# Patient Record
Sex: MALE | Race: WHITE | NOT HISPANIC OR LATINO | Employment: UNEMPLOYED | URBAN - METROPOLITAN AREA
[De-identification: names, ages, dates, MRNs, and addresses within clinical notes are randomized per-mention and may not be internally consistent; named-entity substitution may affect disease eponyms.]

---

## 2018-05-23 ENCOUNTER — OFFICE VISIT CONVERTED (OUTPATIENT)
Dept: FAMILY MEDICINE CLINIC | Facility: CLINIC | Age: 43
End: 2018-05-23
Attending: NURSE PRACTITIONER

## 2018-06-20 ENCOUNTER — OFFICE VISIT CONVERTED (OUTPATIENT)
Dept: FAMILY MEDICINE CLINIC | Facility: CLINIC | Age: 43
End: 2018-06-20
Attending: NURSE PRACTITIONER

## 2018-07-18 ENCOUNTER — OFFICE VISIT CONVERTED (OUTPATIENT)
Dept: FAMILY MEDICINE CLINIC | Facility: CLINIC | Age: 43
End: 2018-07-18
Attending: NURSE PRACTITIONER

## 2019-01-18 ENCOUNTER — HOSPITAL ENCOUNTER (OUTPATIENT)
Dept: FAMILY MEDICINE CLINIC | Facility: CLINIC | Age: 44
Discharge: HOME | End: 2019-01-18
Attending: NURSE PRACTITIONER

## 2019-01-18 ENCOUNTER — OFFICE VISIT CONVERTED (OUTPATIENT)
Dept: FAMILY MEDICINE CLINIC | Facility: CLINIC | Age: 44
End: 2019-01-18
Attending: NURSE PRACTITIONER

## 2019-01-18 LAB
ALBUMIN SERPL-MCNC: 4.4 G/DL (ref 3.5–5)
ALBUMIN/GLOB SERPL: 1.8 {RATIO} (ref 1.4–2.6)
ALP SERPL-CCNC: 70 U/L (ref 53–128)
ALT SERPL-CCNC: 20 U/L (ref 10–40)
ANION GAP SERPL CALC-SCNC: 18 MMOL/L (ref 8–19)
AST SERPL-CCNC: 17 U/L (ref 15–50)
BASOPHILS # BLD AUTO: 0.06 10*3/UL (ref 0–0.2)
BASOPHILS NFR BLD AUTO: 0.73 % (ref 0–3)
BILIRUB SERPL-MCNC: 0.25 MG/DL (ref 0.2–1.3)
BUN SERPL-MCNC: 13 MG/DL (ref 5–25)
BUN/CREAT SERPL: 13 {RATIO} (ref 6–20)
CALCIUM SERPL-MCNC: 9.2 MG/DL (ref 8.7–10.4)
CHLORIDE SERPL-SCNC: 108 MMOL/L (ref 99–111)
CHOLEST SERPL-MCNC: 182 MG/DL (ref 107–200)
CHOLEST/HDLC SERPL: 5.1 {RATIO} (ref 3–6)
CONV CO2: 23 MMOL/L (ref 22–32)
CONV TOTAL PROTEIN: 6.8 G/DL (ref 6.3–8.2)
CREAT UR-MCNC: 1.04 MG/DL (ref 0.7–1.2)
EOSINOPHIL # BLD AUTO: 0.18 10*3/UL (ref 0–0.7)
EOSINOPHIL # BLD AUTO: 2.16 % (ref 0–7)
ERYTHROCYTE [DISTWIDTH] IN BLOOD BY AUTOMATED COUNT: 11.6 % (ref 11.5–14.5)
GFR SERPLBLD BASED ON 1.73 SQ M-ARVRAT: >60 ML/MIN/{1.73_M2}
GLOBULIN UR ELPH-MCNC: 2.4 G/DL (ref 2–3.5)
GLUCOSE SERPL-MCNC: 85 MG/DL (ref 70–99)
HBA1C MFR BLD: 14.8 G/DL (ref 14–18)
HCT VFR BLD AUTO: 41.6 % (ref 42–52)
HDLC SERPL-MCNC: 36 MG/DL (ref 40–60)
LDLC SERPL CALC-MCNC: 98 MG/DL (ref 70–100)
LYMPHOCYTES # BLD AUTO: 3.27 10*3/UL (ref 1–5)
MCH RBC QN AUTO: 32 PG (ref 27–31)
MCHC RBC AUTO-ENTMCNC: 35.5 G/DL (ref 33–37)
MCV RBC AUTO: 90.1 FL (ref 80–96)
MONOCYTES # BLD AUTO: 0.59 10*3/UL (ref 0.2–1.2)
MONOCYTES NFR BLD AUTO: 7.1 % (ref 3–10)
NEUTROPHILS # BLD AUTO: 4.27 10*3/UL (ref 2–8)
NEUTROPHILS NFR BLD AUTO: 51 % (ref 30–85)
NRBC BLD AUTO-RTO: 0 % (ref 0–0.01)
OSMOLALITY SERPL CALC.SUM OF ELEC: 299 MOSM/KG (ref 273–304)
PLATELET # BLD AUTO: 292 10*3/UL (ref 130–400)
PMV BLD AUTO: 7 FL (ref 7.4–10.4)
POTASSIUM SERPL-SCNC: 3.9 MMOL/L (ref 3.5–5.3)
RBC # BLD AUTO: 4.62 10*6/UL (ref 4.7–6.1)
SODIUM SERPL-SCNC: 145 MMOL/L (ref 135–147)
T4 FREE SERPL-MCNC: 1.4 NG/DL (ref 0.9–1.8)
TRIGL SERPL-MCNC: 238 MG/DL (ref 40–150)
TSH SERPL-ACNC: 1.35 M[IU]/L (ref 0.27–4.2)
VARIANT LYMPHS NFR BLD MANUAL: 39 % (ref 20–45)
VLDLC SERPL-MCNC: 48 MG/DL (ref 5–37)
WBC # BLD AUTO: 8.37 10*3/UL (ref 4.8–10.8)

## 2019-01-19 LAB — 25(OH)D3 SERPL-MCNC: 35.7 NG/ML (ref 30–100)

## 2019-07-11 ENCOUNTER — HOSPITAL ENCOUNTER (OUTPATIENT)
Dept: FAMILY MEDICINE CLINIC | Facility: CLINIC | Age: 44
Discharge: HOME | End: 2019-07-11
Attending: NURSE PRACTITIONER

## 2019-07-11 ENCOUNTER — CONVERSION ENCOUNTER (OUTPATIENT)
Dept: FAMILY MEDICINE CLINIC | Facility: CLINIC | Age: 44
End: 2019-07-11

## 2019-07-11 ENCOUNTER — OFFICE VISIT CONVERTED (OUTPATIENT)
Dept: FAMILY MEDICINE CLINIC | Facility: CLINIC | Age: 44
End: 2019-07-11
Attending: NURSE PRACTITIONER

## 2019-07-11 LAB
ALBUMIN SERPL-MCNC: 4.3 G/DL (ref 3.5–5)
ALBUMIN/GLOB SERPL: 1.6 {RATIO} (ref 1.4–2.6)
ALP SERPL-CCNC: 60 U/L (ref 53–128)
ALT SERPL-CCNC: 40 U/L (ref 10–40)
ANION GAP SERPL CALC-SCNC: 17 MMOL/L (ref 8–19)
AST SERPL-CCNC: 21 U/L (ref 15–50)
BASOPHILS # BLD AUTO: 0.08 10*3/UL (ref 0–0.2)
BASOPHILS NFR BLD AUTO: 1 % (ref 0–3)
BILIRUB SERPL-MCNC: 0.28 MG/DL (ref 0.2–1.3)
BUN SERPL-MCNC: 12 MG/DL (ref 5–25)
BUN/CREAT SERPL: 12 {RATIO} (ref 6–20)
CALCIUM SERPL-MCNC: 9.2 MG/DL (ref 8.7–10.4)
CHLORIDE SERPL-SCNC: 100 MMOL/L (ref 99–111)
CHOLEST SERPL-MCNC: 190 MG/DL (ref 107–200)
CHOLEST/HDLC SERPL: 5.9 {RATIO} (ref 3–6)
CONV ABS IMM GRAN: 0.03 10*3/UL (ref 0–0.2)
CONV CO2: 24 MMOL/L (ref 22–32)
CONV IMMATURE GRAN: 0.4 % (ref 0–1.8)
CONV TOTAL PROTEIN: 7 G/DL (ref 6.3–8.2)
CREAT UR-MCNC: 1.01 MG/DL (ref 0.7–1.2)
DEPRECATED RDW RBC AUTO: 40.4 FL (ref 35.1–43.9)
EOSINOPHIL # BLD AUTO: 0.19 10*3/UL (ref 0–0.7)
EOSINOPHIL # BLD AUTO: 2.3 % (ref 0–7)
ERYTHROCYTE [DISTWIDTH] IN BLOOD BY AUTOMATED COUNT: 12.3 % (ref 11.6–14.4)
GFR SERPLBLD BASED ON 1.73 SQ M-ARVRAT: >60 ML/MIN/{1.73_M2}
GLOBULIN UR ELPH-MCNC: 2.7 G/DL (ref 2–3.5)
GLUCOSE SERPL-MCNC: 101 MG/DL (ref 70–99)
HBA1C MFR BLD: 14.2 G/DL (ref 14–18)
HCT VFR BLD AUTO: 42.3 % (ref 42–52)
HDLC SERPL-MCNC: 32 MG/DL (ref 40–60)
LDLC SERPL CALC-MCNC: 88 MG/DL (ref 70–100)
LYMPHOCYTES # BLD AUTO: 2.83 10*3/UL (ref 1–5)
MCH RBC QN AUTO: 30.1 PG (ref 27–31)
MCHC RBC AUTO-ENTMCNC: 33.6 G/DL (ref 33–37)
MCV RBC AUTO: 89.6 FL (ref 80–96)
MONOCYTES # BLD AUTO: 0.82 10*3/UL (ref 0.2–1.2)
MONOCYTES NFR BLD AUTO: 9.8 % (ref 3–10)
NEUTROPHILS # BLD AUTO: 4.39 10*3/UL (ref 2–8)
NEUTROPHILS NFR BLD AUTO: 52.6 % (ref 30–85)
NRBC CBCN: 0 % (ref 0–0.7)
OSMOLALITY SERPL CALC.SUM OF ELEC: 284 MOSM/KG (ref 273–304)
PLATELET # BLD AUTO: 308 10*3/UL (ref 130–400)
PMV BLD AUTO: 9.6 FL (ref 9.4–12.4)
POTASSIUM SERPL-SCNC: 3.9 MMOL/L (ref 3.5–5.3)
RBC # BLD AUTO: 4.72 10*6/UL (ref 4.7–6.1)
SODIUM SERPL-SCNC: 137 MMOL/L (ref 135–147)
T4 FREE SERPL-MCNC: 1.2 NG/DL (ref 0.9–1.8)
TRIGL SERPL-MCNC: 349 MG/DL (ref 40–150)
TSH SERPL-ACNC: 2.71 M[IU]/L (ref 0.27–4.2)
VARIANT LYMPHS NFR BLD MANUAL: 33.9 % (ref 20–45)
VLDLC SERPL-MCNC: 70 MG/DL (ref 5–37)
WBC # BLD AUTO: 8.34 10*3/UL (ref 4.8–10.8)

## 2020-01-10 ENCOUNTER — OFFICE VISIT CONVERTED (OUTPATIENT)
Dept: FAMILY MEDICINE CLINIC | Facility: CLINIC | Age: 45
End: 2020-01-10
Attending: NURSE PRACTITIONER

## 2020-01-10 ENCOUNTER — HOSPITAL ENCOUNTER (OUTPATIENT)
Dept: FAMILY MEDICINE CLINIC | Facility: CLINIC | Age: 45
Discharge: HOME | End: 2020-01-10
Attending: NURSE PRACTITIONER

## 2020-01-10 LAB
25(OH)D3 SERPL-MCNC: 32.1 NG/ML (ref 30–100)
ALBUMIN SERPL-MCNC: 4.4 G/DL (ref 3.5–5)
ALBUMIN/GLOB SERPL: 1.6 {RATIO} (ref 1.4–2.6)
ALP SERPL-CCNC: 54 U/L (ref 53–128)
ALT SERPL-CCNC: 42 U/L (ref 10–40)
ANION GAP SERPL CALC-SCNC: 14 MMOL/L (ref 8–19)
AST SERPL-CCNC: 25 U/L (ref 15–50)
BASOPHILS # BLD AUTO: 0.05 10*3/UL (ref 0–0.2)
BASOPHILS NFR BLD AUTO: 0.6 % (ref 0–3)
BILIRUB SERPL-MCNC: 0.51 MG/DL (ref 0.2–1.3)
BUN SERPL-MCNC: 7 MG/DL (ref 5–25)
BUN/CREAT SERPL: 7 {RATIO} (ref 6–20)
CALCIUM SERPL-MCNC: 9.8 MG/DL (ref 8.7–10.4)
CHLORIDE SERPL-SCNC: 100 MMOL/L (ref 99–111)
CHOLEST SERPL-MCNC: 178 MG/DL (ref 107–200)
CHOLEST/HDLC SERPL: 5.9 {RATIO} (ref 3–6)
CONV ABS IMM GRAN: 0.02 10*3/UL (ref 0–0.2)
CONV CO2: 26 MMOL/L (ref 22–32)
CONV IMMATURE GRAN: 0.3 % (ref 0–1.8)
CONV TOTAL PROTEIN: 7.1 G/DL (ref 6.3–8.2)
CREAT UR-MCNC: 1.01 MG/DL (ref 0.7–1.2)
DEPRECATED RDW RBC AUTO: 40.8 FL (ref 35.1–43.9)
EOSINOPHIL # BLD AUTO: 0.22 10*3/UL (ref 0–0.7)
EOSINOPHIL # BLD AUTO: 2.8 % (ref 0–7)
ERYTHROCYTE [DISTWIDTH] IN BLOOD BY AUTOMATED COUNT: 12.4 % (ref 11.6–14.4)
FOLATE SERPL-MCNC: >20 NG/ML (ref 4.8–20)
GFR SERPLBLD BASED ON 1.73 SQ M-ARVRAT: >60 ML/MIN/{1.73_M2}
GLOBULIN UR ELPH-MCNC: 2.7 G/DL (ref 2–3.5)
GLUCOSE SERPL-MCNC: 92 MG/DL (ref 70–99)
HCT VFR BLD AUTO: 39.7 % (ref 42–52)
HDLC SERPL-MCNC: 30 MG/DL (ref 40–60)
HGB BLD-MCNC: 13.4 G/DL (ref 14–18)
LDLC SERPL CALC-MCNC: 120 MG/DL (ref 70–100)
LYMPHOCYTES # BLD AUTO: 2.98 10*3/UL (ref 1–5)
LYMPHOCYTES NFR BLD AUTO: 37.4 % (ref 20–45)
MCH RBC QN AUTO: 30.5 PG (ref 27–31)
MCHC RBC AUTO-ENTMCNC: 33.8 G/DL (ref 33–37)
MCV RBC AUTO: 90.2 FL (ref 80–96)
MONOCYTES # BLD AUTO: 0.66 10*3/UL (ref 0.2–1.2)
MONOCYTES NFR BLD AUTO: 8.3 % (ref 3–10)
NEUTROPHILS # BLD AUTO: 4.04 10*3/UL (ref 2–8)
NEUTROPHILS NFR BLD AUTO: 50.6 % (ref 30–85)
NRBC CBCN: 0 % (ref 0–0.7)
OSMOLALITY SERPL CALC.SUM OF ELEC: 280 MOSM/KG (ref 273–304)
PLATELET # BLD AUTO: 299 10*3/UL (ref 130–400)
PMV BLD AUTO: 9.8 FL (ref 9.4–12.4)
POTASSIUM SERPL-SCNC: 3.8 MMOL/L (ref 3.5–5.3)
RBC # BLD AUTO: 4.4 10*6/UL (ref 4.7–6.1)
SODIUM SERPL-SCNC: 136 MMOL/L (ref 135–147)
T4 FREE SERPL-MCNC: 1.2 NG/DL (ref 0.9–1.8)
TRIGL SERPL-MCNC: 139 MG/DL (ref 40–150)
TSH SERPL-ACNC: 1.22 M[IU]/L (ref 0.27–4.2)
VIT B12 SERPL-MCNC: 940 PG/ML (ref 211–911)
VLDLC SERPL-MCNC: 28 MG/DL (ref 5–37)
WBC # BLD AUTO: 7.97 10*3/UL (ref 4.8–10.8)

## 2020-01-16 ENCOUNTER — HOSPITAL ENCOUNTER (OUTPATIENT)
Dept: FAMILY MEDICINE CLINIC | Facility: CLINIC | Age: 45
Discharge: HOME | End: 2020-01-16
Attending: NURSE PRACTITIONER

## 2020-01-16 LAB
IRON SATN MFR SERPL: 24 % (ref 20–55)
IRON SERPL-MCNC: 76 UG/DL (ref 70–180)
TIBC SERPL-MCNC: 316 UG/DL (ref 245–450)
TRANSFERRIN SERPL-MCNC: 221 MG/DL (ref 215–365)

## 2020-10-29 ENCOUNTER — CONVERSION ENCOUNTER (OUTPATIENT)
Dept: FAMILY MEDICINE CLINIC | Facility: CLINIC | Age: 45
End: 2020-10-29

## 2020-10-29 ENCOUNTER — OFFICE VISIT CONVERTED (OUTPATIENT)
Dept: FAMILY MEDICINE CLINIC | Facility: CLINIC | Age: 45
End: 2020-10-29
Attending: NURSE PRACTITIONER

## 2020-10-29 ENCOUNTER — HOSPITAL ENCOUNTER (OUTPATIENT)
Dept: FAMILY MEDICINE CLINIC | Facility: CLINIC | Age: 45
Discharge: HOME | End: 2020-10-29
Attending: NURSE PRACTITIONER

## 2020-10-29 LAB
ALBUMIN SERPL-MCNC: 4.3 G/DL (ref 3.5–5)
ALBUMIN/GLOB SERPL: 1.6 {RATIO} (ref 1.4–2.6)
ALP SERPL-CCNC: 67 U/L (ref 53–128)
ALT SERPL-CCNC: 36 U/L (ref 10–40)
ANION GAP SERPL CALC-SCNC: 13 MMOL/L (ref 8–19)
APPEARANCE UR: CLEAR
AST SERPL-CCNC: 21 U/L (ref 15–50)
BASOPHILS # BLD AUTO: 0.09 10*3/UL (ref 0–0.2)
BASOPHILS NFR BLD AUTO: 1.2 % (ref 0–3)
BILIRUB SERPL-MCNC: 0.33 MG/DL (ref 0.2–1.3)
BILIRUB UR QL: NEGATIVE
BUN SERPL-MCNC: 11 MG/DL (ref 5–25)
BUN/CREAT SERPL: 9 {RATIO} (ref 6–20)
CALCIUM SERPL-MCNC: 9.6 MG/DL (ref 8.7–10.4)
CHLORIDE SERPL-SCNC: 104 MMOL/L (ref 99–111)
CHOLEST SERPL-MCNC: 163 MG/DL (ref 107–200)
CHOLEST/HDLC SERPL: 5.6 {RATIO} (ref 3–6)
COLOR UR: YELLOW
CONV ABS IMM GRAN: 0.02 10*3/UL (ref 0–0.2)
CONV CO2: 27 MMOL/L (ref 22–32)
CONV COLLECTION SOURCE (UA): NORMAL
CONV IMMATURE GRAN: 0.3 % (ref 0–1.8)
CONV TOTAL PROTEIN: 7 G/DL (ref 6.3–8.2)
CONV UROBILINOGEN IN URINE BY AUTOMATED TEST STRIP: 0.2 {EHRLICHU}/DL (ref 0.1–1)
CREAT UR-MCNC: 1.16 MG/DL (ref 0.7–1.2)
DEPRECATED RDW RBC AUTO: 40.3 FL (ref 35.1–43.9)
EOSINOPHIL # BLD AUTO: 0.15 10*3/UL (ref 0–0.7)
EOSINOPHIL # BLD AUTO: 2 % (ref 0–7)
ERYTHROCYTE [DISTWIDTH] IN BLOOD BY AUTOMATED COUNT: 12.4 % (ref 11.6–14.4)
FOLATE SERPL-MCNC: >20 NG/ML (ref 4.8–20)
GFR SERPLBLD BASED ON 1.73 SQ M-ARVRAT: >60 ML/MIN/{1.73_M2}
GLOBULIN UR ELPH-MCNC: 2.7 G/DL (ref 2–3.5)
GLUCOSE SERPL-MCNC: 95 MG/DL (ref 70–99)
GLUCOSE UR QL: NEGATIVE MG/DL
HCT VFR BLD AUTO: 41.8 % (ref 42–52)
HDLC SERPL-MCNC: 29 MG/DL (ref 40–60)
HGB BLD-MCNC: 13.9 G/DL (ref 14–18)
HGB UR QL STRIP: NEGATIVE
IRON SATN MFR SERPL: 28 % (ref 20–55)
IRON SERPL-MCNC: 92 UG/DL (ref 70–180)
KETONES UR QL STRIP: NEGATIVE MG/DL
LDLC SERPL CALC-MCNC: 65 MG/DL (ref 70–100)
LEUKOCYTE ESTERASE UR QL STRIP: NEGATIVE
LYMPHOCYTES # BLD AUTO: 2.53 10*3/UL (ref 1–5)
LYMPHOCYTES NFR BLD AUTO: 33.7 % (ref 20–45)
MCH RBC QN AUTO: 29.8 PG (ref 27–31)
MCHC RBC AUTO-ENTMCNC: 33.3 G/DL (ref 33–37)
MCV RBC AUTO: 89.7 FL (ref 80–96)
MONOCYTES # BLD AUTO: 0.69 10*3/UL (ref 0.2–1.2)
MONOCYTES NFR BLD AUTO: 9.2 % (ref 3–10)
NEUTROPHILS # BLD AUTO: 4.03 10*3/UL (ref 2–8)
NEUTROPHILS NFR BLD AUTO: 53.6 % (ref 30–85)
NITRITE UR QL STRIP: NEGATIVE
NRBC CBCN: 0 % (ref 0–0.7)
OSMOLALITY SERPL CALC.SUM OF ELEC: 289 MOSM/KG (ref 273–304)
PH UR STRIP.AUTO: 6 [PH] (ref 5–8)
PLATELET # BLD AUTO: 309 10*3/UL (ref 130–400)
PMV BLD AUTO: 9.7 FL (ref 9.4–12.4)
POTASSIUM SERPL-SCNC: 4.2 MMOL/L (ref 3.5–5.3)
PROT UR QL: NEGATIVE MG/DL
RBC # BLD AUTO: 4.66 10*6/UL (ref 4.7–6.1)
SODIUM SERPL-SCNC: 140 MMOL/L (ref 135–147)
SP GR UR: 1 (ref 1–1.03)
TESTOST SERPL-MCNC: 187 NG/DL (ref 249–836)
TIBC SERPL-MCNC: 325 UG/DL (ref 245–450)
TRANSFERRIN SERPL-MCNC: 227 MG/DL (ref 215–365)
TRIGL SERPL-MCNC: 346 MG/DL (ref 40–150)
TSH SERPL-ACNC: 0.83 M[IU]/L (ref 0.27–4.2)
VIT B12 SERPL-MCNC: 765 PG/ML (ref 211–911)
VLDLC SERPL-MCNC: 69 MG/DL (ref 5–37)
WBC # BLD AUTO: 7.51 10*3/UL (ref 4.8–10.8)

## 2020-10-30 ENCOUNTER — HOSPITAL ENCOUNTER (OUTPATIENT)
Dept: FAMILY MEDICINE CLINIC | Facility: CLINIC | Age: 45
Discharge: HOME | End: 2020-10-30
Attending: NURSE PRACTITIONER

## 2020-10-31 LAB
TESTOST SERPL-MCNC: 243 NG/DL (ref 249–836)
TESTOSTERONE, FREE: 5.1 PG/ML (ref 6.8–21.5)

## 2020-11-01 LAB — TESTOSTERONE, FREE: 5.5 PG/ML (ref 6.8–21.5)

## 2021-01-10 ENCOUNTER — HOSPITAL ENCOUNTER (OUTPATIENT)
Dept: URGENT CARE | Facility: CLINIC | Age: 46
Discharge: HOME | End: 2021-01-10
Attending: NURSE PRACTITIONER

## 2021-02-04 ENCOUNTER — CONVERSION ENCOUNTER (OUTPATIENT)
Dept: FAMILY MEDICINE CLINIC | Facility: CLINIC | Age: 46
End: 2021-02-04

## 2021-03-10 ENCOUNTER — OFFICE VISIT (OUTPATIENT)
Dept: ENDOCRINOLOGY | Age: 46
End: 2021-03-10

## 2021-03-10 VITALS
BODY MASS INDEX: 32.14 KG/M2 | HEIGHT: 69 IN | RESPIRATION RATE: 16 BRPM | WEIGHT: 217 LBS | SYSTOLIC BLOOD PRESSURE: 134 MMHG | DIASTOLIC BLOOD PRESSURE: 86 MMHG

## 2021-03-10 DIAGNOSIS — E29.1 HYPOGONADISM IN MALE: Primary | ICD-10-CM

## 2021-03-10 PROCEDURE — 99204 OFFICE O/P NEW MOD 45 MIN: CPT | Performed by: INTERNAL MEDICINE

## 2021-03-10 RX ORDER — ATENOLOL 25 MG/1
TABLET ORAL
COMMUNITY
Start: 2021-01-09 | End: 2021-08-04 | Stop reason: SDUPTHER

## 2021-03-10 RX ORDER — MELOXICAM 15 MG/1
15 TABLET ORAL DAILY PRN
COMMUNITY
Start: 2021-02-27

## 2021-03-10 RX ORDER — HYDROCODONE BITARTRATE AND ACETAMINOPHEN 5; 325 MG/1; MG/1
1 TABLET ORAL EVERY 12 HOURS PRN
COMMUNITY
Start: 2021-03-02

## 2021-03-10 NOTE — ASSESSMENT & PLAN NOTE
Unfortunately we do not have the notes or the biochemical work-up that was done for this patient  We will try to obtain labs  Patient did present labs from his phone that showed a testosterone of 241  However there was no documentation of other pituitary hormones specifically the gonadotropins on this lab  Also unfortunate is that the patient is already started testosterone 200 mg IM weekly compounded with anastrozole and gonadorhelin  To properly assess the patient's need for testosterone and possible etiology of hypogonadism we may need to hold his current therapy for 4 to 6 weeks  After this time a LH/FSH, prolactin, total testosterone, free testosterone, sex hormone binding globulin, TSH, free T4, IGF-I, growth hormone, ACTH, and cortisol will need to be assessed at this patient's morning time 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

## 2021-03-10 NOTE — PROGRESS NOTES
"Chief Complaint  Hypogonadism    Subjective          Avtar Chapman presents to [facility] ENDOCRINOLOGY  45-year-old male with past medical history of of back pain and anxiety who presents for evaluation for hypogonadism.        Hypogonadism  Mr. Barney Chapman presents with labs today that show a testosterone level of 241.  Patient notes that his symptoms have been present for 1 to 2 years.  He notes fatigue and tiredness and difficulty with normal exercise routine.  He notes that he has intermittent morning erections.  He notes that he is able to have erections when stimulated.  He denies any history of head trauma or testicular trauma.  He notes he has to shave about twice weekly and that there has not been no change in his body hair.  He notes that he works and shifts and sometimes has to work night shift.  He notes that he is currently working nights.  He notes that he has been on night shift for the last 4 months.  His shifts are currently 10 PM to 6 AM.  He notes that he typically wakes around noon.  Patient notes that he is required no core to help with his back pain for the last several years.  He currently is taking testosterone 200 mg IM weekly combined and gonadorelin twice weekly. Patient notes that he feels about the same, though he may have some improvement in his exercise tolerance        Objective   Vital Signs:   /86   Resp 16   Ht 175.3 cm (69\")   Wt 98.4 kg (217 lb)   BMI 32.05 kg/m²     Physical Exam  Vitals reviewed.   Constitutional:       Appearance: Normal appearance. He is normal weight.   Eyes:      Extraocular Movements: Extraocular movements intact.      Conjunctiva/sclera: Conjunctivae normal.      Pupils: Pupils are equal, round, and reactive to light.   Neck:      Thyroid: No thyroid mass, thyromegaly or thyroid tenderness.      Vascular: No carotid bruit.   Cardiovascular:      Rate and Rhythm: Normal rate and regular rhythm.   Abdominal:      General: " Abdomen is flat. Bowel sounds are normal.      Palpations: Abdomen is soft.   Lymphadenopathy:      Cervical: No cervical adenopathy.   Skin:     General: Skin is warm and dry.   Neurological:      General: No focal deficit present.      Mental Status: He is alert and oriented to person, place, and time.   Psychiatric:         Mood and Affect: Mood normal.         Behavior: Behavior normal.        Result Review :   The following data was reviewed by: Jaylene Petersen MD on 03/10/2021:      [facility] results show a testosterone level of 241                                  Data reviewed: Patient presents a copy of his labs from [facility]          Assessment and Plan    Diagnoses and all orders for this visit:    1. Hypogonadism in male (Primary)  Assessment & Plan:  Unfortunately we do not have the notes or the biochemical work-up that was done for this patient  We will try to obtain labs  Patient did present labs from his phone that showed a testosterone of 241  However there was no documentation of other pituitary hormones specifically the gonadotropins on this lab  Also unfortunate is that the patient is already started testosterone 200 mg IM weekly compounded with anastrozole and gonadorhelin  To properly assess the patient's need for testosterone and possible etiology of hypogonadism we may need to hold his current therapy for 4 to 6 weeks  After this time a LH/FSH, prolactin, total testosterone, free testosterone, sex hormone binding globulin, TSH, free T4, IGF-I, growth hormone, ACTH, and cortisol will need to be assessed at this patient's morning time 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          I spent 45 minutes caring for Avtar on this date of service. This time includes time spent by me in the following activities:preparing for the visit, reviewing tests, obtaining and/or reviewing a separately obtained history, performing a medically appropriate examination and/or  evaluation , counseling and educating the patient/family/caregiver, referring and communicating with other health care professionals  and documenting information in the medical record  Follow Up   Return in about 8 weeks (around 5/5/2021).  Patient was given instructions and counseling regarding his condition or for health maintenance advice. Please see specific information pulled into the AVS if appropriate.

## 2021-03-13 ENCOUNTER — HOSPITAL ENCOUNTER (OUTPATIENT)
Dept: URGENT CARE | Facility: CLINIC | Age: 46
Discharge: HOME | End: 2021-03-13
Attending: FAMILY MEDICINE

## 2021-03-17 ENCOUNTER — TELEPHONE (OUTPATIENT)
Dept: ENDOCRINOLOGY | Age: 46
End: 2021-03-17

## 2021-03-17 NOTE — TELEPHONE ENCOUNTER
The number for Estevan Lezama APRN  [phone] is a non working number       ----- Message from Claudia Petersen MD sent at 3/10/2021 10:36 AM EST -----  Regarding: tesosterone labs and notes   Estevan Lezama, FARA    PCP - General, Nurse Practitioner    Since 12/4/2020    [phone]    Patient has had labs done for tesosterone. Please try to get both sets of labs and his notes.

## 2021-03-18 ENCOUNTER — OFFICE VISIT CONVERTED (OUTPATIENT)
Dept: FAMILY MEDICINE CLINIC | Facility: CLINIC | Age: 46
End: 2021-03-18
Attending: NURSE PRACTITIONER

## 2021-03-18 ENCOUNTER — HOSPITAL ENCOUNTER (OUTPATIENT)
Dept: GENERAL RADIOLOGY | Facility: HOSPITAL | Age: 46
Discharge: HOME | End: 2021-03-18
Attending: NURSE PRACTITIONER

## 2021-03-19 DIAGNOSIS — E29.1 HYPOGONADISM IN MALE: Primary | ICD-10-CM

## 2021-04-01 ENCOUNTER — HOSPITAL ENCOUNTER (OUTPATIENT)
Dept: PHYSICAL THERAPY | Facility: CLINIC | Age: 46
Setting detail: RECURRING SERIES
Discharge: HOME | End: 2021-04-01
Attending: NURSE PRACTITIONER

## 2021-04-09 ENCOUNTER — TELEPHONE (OUTPATIENT)
Dept: ENDOCRINOLOGY | Age: 46
End: 2021-04-09

## 2021-05-07 ENCOUNTER — OFFICE VISIT CONVERTED (OUTPATIENT)
Dept: PODIATRY | Facility: CLINIC | Age: 46
End: 2021-05-07
Attending: PODIATRIST

## 2021-05-13 NOTE — PROGRESS NOTES
Progress Note      Patient Name: Avtar Chapman   Patient ID: [id number]   Sex: Male   YOB: 1975    Primary Care Provider: Estevan CHAUDHARI    Visit Date: October 29, 2020    Provider: FARA Moreno   Location: [facility]   Location Address: [address] [place] [address]  [id number]   Location Phone: [phone]          Chief Complaint  · annual physical       History Of Present Illness  Avtar Chapman is a 44 year old /White male who presents for evaluation and treatment of:      annual physical     follow up for anxiety, depression, hypertension, fatigue, hyperlipidemia, insomnia  medication refills     no recent lab work  pt requests refill of Ativan, states uses sparingly   pt reports anxiety is controlled most days  no currently counseling or therapy, stopped after divorce   pt denies si.hi     pt c/o fatigue and decrease in stamina at the gym, feels like he is having trouble losing weight and losing muscle mass  states he did quit smoking feels this may be related to difficulty losing weight  pt denies joint pain  pt request testosterone level to be checked        exercising 3-4 days per week  nonsmoker  no eoth  last dental exam annual 2020  last eye exam annual 2020     already received flu shot at [facility]     pt c/o ears feeling clogged up       Past Medical History  Disease Name Date Onset Notes   Allergic rhinitis --  --    Depression 06/20/2018 --    GERD --  --    Hyperlipidemia 08/22/2017 --    Hypertension --  --          Past Surgical History  Procedure Name Date Notes   *No Past Surgical History --  --          Medication List  Name Date Started Instructions   atenolol 25 mg oral tablet 10/29/2020 TAKE 1 TABLET (25 MG) BY ORAL ROUTE ONCE DAILY FOR 90 DAYS for 90 days   Ativan 1 mg oral tablet 10/29/2020 take 1 tablet (1 mg) by oral route 2 times per day as needed for 90 days    hydrocodone-acetaminophen 5-325 mg oral tablet  take 1 tablet by oral route every 6 hours as needed for pain         Allergy List  Allergen Name Date Reaction Notes   NO KNOWN DRUG ALLERGIES --  --  --          Family Medical History  Disease Name Relative/Age Notes   Lung Neoplasm, Malignant Father/   --    Colon Cancer  grandparents/not specified         Social History  Finding Status Start/Stop Quantity Notes   Alcohol Current some day 21/-- 4 cans per mon. only drinks on ocassions/4 cans per month if that   Tobacco Former 14/42 1/2 ppd quit smoking a month ago         Immunizations  NameDate Admin Mfg Trade Name Lot Number Route Inj VIS Given VIS Publication   Qiirrdvgl00/20/2014 SKB [employer]um[employer] quadrivalent [id number] IM LD 10/20/2014 07/02/2012   Comments:    Tdap10/20/2014 SKB BOOSTRIX [id number] IM RA 10/20/2014 01/24/2012   Comments:          Review of Systems  · Constitutional  o Denies  o : fatigue, fever, weight gain, weight loss, chills  · Eyes  o Denies  o : changes in vision  · HENT  o Denies  o : ear pain, sore throat  · Cardiovascular  o Denies  o : chest Pain, palpitations, edema (swelling)  · Respiratory  o Denies  o : frequent cough, shortness of breath  · Gastrointestinal  o Denies  o : nausea, vomiting, changes in bowel habits  · Genitourinary  o Denies  o : dysuria, urinary frequency, urinary urgency, polyuria  · Integument  o Denies  o : rash  · Neurologic  o Denies  o : headache, tingling or numbness, dizziness  · Musculoskeletal  o Denies  o : joint pain, myalgias  · Endocrine  o Denies  o : polydipsia, polyphagia  · Psychiatric  o Denies  o : mood changes, memory changes, SI/HI  · Heme-Lymph  o Denies  o : easy bruising, easy bleeding, lymph node enlargement or tenderness  · Allergic-Immunologic  o Admits  o : seasonal allergies  o Denies  o : eczema, urticaria      Vitals  Date Time BP Position Site L\R Cuff Size HR RR TEMP (F) WT  HT  BMI kg/m2 BSA m2 O2 Sat FR L/min FiO2        07/11/2019 02:12  "/77 Sitting    56 - R 18 97.1 183lbs 0oz 5'  9\" 27.02 2.01 98 %  21%    01/10/2020 02:32 /79 Sitting    57 - R 18 98.4 185lbs 16oz 5'  9\" 27.47 2.03 99 %  21%    10/29/2020 08:02 /73 Sitting    74 - R 24 98.9 207lbs 4oz 5'  9\" 30.61 2.14 98 %  21%          Physical Examination  · Constitutional  o Appearance  o : well-nourished, in no acute distress  · Eyes  o Conjunctivae  o : conjunctivae normal  o Sclerae  o : sclerae white  o Pupils and Irises  o : pupils equal and round  o Eyelids/Ocular Adnexae  o : eyelid appearance normal, no exudates present  · Ears, Nose, Mouth and Throat  o Ears  o :   § External Ears  § : external auditory canal appearance within normal limits, no discharge present  § Otoscopic Examination  § : tympanic membrane appearance injected bilaterally  o Nose  o :   § External Nose  § : appearance normal  § Intranasal Exam  § : mucosa inflamed, clear discharge  § Nasopharynx  § : no discharge present  o Oral Cavity  o :   § Oral Mucosa  § : oral mucosa normal  o Throat  o :   § Oropharynx  § : no inflammation or lesions present, tonsils within normal limits, pnd  · Neck  o Inspection/Palpation  o : normal appearance, no masses or tenderness, trachea midline  o Thyroid  o : gland size normal, nontender  · Respiratory  o Respiratory Effort  o : breathing unlabored  o Inspection of Chest  o : normal appearance  o Auscultation of Lungs  o : normal breath sounds throughout inspiration and expiration  · Cardiovascular  o Heart  o :   § Auscultation of Heart  § : regular rate and rhythm, no murmurs  o Peripheral Vascular System  o :   § Carotid Arteries  § : no bruits present  § Extremities  § : no clubbing or edema  · Gastrointestinal  o Abdominal Examination  o : abdomen nontender to palpation, bowel sounds present   · Lymphatic  o Neck  o : no lymphadenopathy present  · Skin and Subcutaneous Tissue  o General Inspection  o : pink, warm, dry   · Neurologic  o Mental Status " Examination  o :   § Orientation  § : grossly oriented to person, place and time  o Gait and Station  o : normal gait, able to stand without difficulty  · Psychiatric  o Judgement and Insight  o : judgment and insight intact  o Mood and Affect  o : mood normal, affect appropriate          Results  · In-Office Procedures  o Lab procedure  § IOP - Urine Drug Screen In-House [facility] (93843)   § Amphetamines Ur Ql: Negative   § Barbiturates Ur Ql: Negative   § Buprenorphine+Nor Ur Ql Scn: Negative   § Benzodiaz Ur Ql: Negative   § Cocaine Ur Ql: Negative   § Methadone Ur Ql: Negative   § Methamphet Ur Ql: Negative   § MDMA Ur Ql Scn: Negative   § Opiates Ur Ql: Positive   § Oxycodone Ur Ql: Negative   § PCP Ur Ql: Negative   § THC Ur Ql: Negative   § Temp in Range?: Within/Acceptable   § Control Seen?: Yes       Assessment  · Screening for depression     V79.0/Z13.89  · Annual physical exam     V70.0/Z00.00  · Allergic rhinitis due to allergen     477.9/J30.9  will start allegra  · Anxiety disorder     300.00/F41.9  prn use only   · Depression     311/F32.9  stable at this time, stopped mediation after divorce   · Essential hypertension     401.9/I10  currently controlled   · Fatigue     780.79/R53.83  · Hyperlipidemia     272.4/E78.5  will obtain lipid panel   · Insomnia, unspecified     780.52/G47.00  · Weight gain     783.1/R63.5      Plan  · Orders  o Urinalysis with Reflex Microscopy ([facility]) (84171) - 401.9/I10 - 10/29/2020  o Iron panel (iron, TIBC, transferrin saturation) (08424, 16857, 21598) - 780.79/R53.83 - 10/29/2020  o B12 Folate levels (B12FO) - 780.79/R53.83 - 10/29/2020  o Physical, Primary Care Panel (CBC, CMP, Lipid, TSH) [facility] (91486, 01087, 62809, 39699) - - 10/29/2020  o KASHIF Report (KASPR) - - 10/29/2020  o ACO-39: Current medications updated and reviewed (, 1159F) - - 10/29/2020  o ACO-18: Negative screen for clinical depression using a standardized tool () - - 10/29/2020  o ACO-14: Influenza  immunization administered or previously received [facility] () - - 10/29/2020  o Testosterone, Free (Direct) (06239) - - 10/29/2020  o Testosterone (Total) (48670) - - 10/29/2020  · Medications  o fexofenadine 180 mg oral tablet   SIG: take 1 tablet (180 mg) by oral route once daily for 90 days   DISP: (90) Tablet with 1 refills  Prescribed on 10/29/2020     o atenolol 25 mg oral tablet   SIG: TAKE 1 TABLET (25 MG) BY ORAL ROUTE ONCE DAILY FOR 90 DAYS for 90 days   DISP: (90) Tablet with 1 refills  Refilled on 10/29/2020     o Ativan 1 mg oral tablet   SIG: take 1 tablet (1 mg) by oral route 2 times per day as needed for 90 days   DISP: (180) Tablet with 1 refills  Refilled on 10/29/2020     o Medications have been Reconciled  o Transition of Care or Provider Policy  · Instructions  o Depression Screen completed and scanned into the EMR under the designated folder within the patient's documents.  o Today's PHQ-9 result is 3  o Reviewed health maintenance flowsheet and updated information. Orders were placed and/or patient's response was documented.  o Patient was educated/instructed on their diagnosis, treatment and medications prior to discharge from the clinic today.  · Disposition  o will contact with diagnostics results  o FOLLOW UP PENDING RESULTS            Electronically Signed by: FARA Moreno -Author on October 29, 2020 09:18:10 AM

## 2021-05-14 VITALS — DIASTOLIC BLOOD PRESSURE: 82 MMHG | SYSTOLIC BLOOD PRESSURE: 138 MMHG

## 2021-05-14 VITALS
TEMPERATURE: 99.1 F | WEIGHT: 217 LBS | RESPIRATION RATE: 18 BRPM | HEART RATE: 64 BPM | DIASTOLIC BLOOD PRESSURE: 80 MMHG | BODY MASS INDEX: 32.14 KG/M2 | SYSTOLIC BLOOD PRESSURE: 136 MMHG | HEIGHT: 69 IN | OXYGEN SATURATION: 99 %

## 2021-05-14 VITALS
RESPIRATION RATE: 24 BRPM | BODY MASS INDEX: 30.7 KG/M2 | HEART RATE: 74 BPM | HEIGHT: 69 IN | DIASTOLIC BLOOD PRESSURE: 73 MMHG | TEMPERATURE: 98.9 F | WEIGHT: 207.25 LBS | SYSTOLIC BLOOD PRESSURE: 126 MMHG | OXYGEN SATURATION: 98 %

## 2021-05-14 NOTE — PROGRESS NOTES
Progress Note      Patient Name: Avtar Chapman   Patient ID: [id number]   Sex: Male   YOB: 1975    Primary Care Provider: Estevan CHAUDHARI    Visit Date: March 18, 2021    Provider: FARA Moreno   Location: [facility]   Location Address: [address] [place] [address]  [id number]   Location Phone: [phone]          Chief Complaint  · ER Follow up      History Of Present Illness  Avtar Chapman is a 45 year old /White male who presents for evaluation and treatment of:      ER Follow up    Pt Went to Urgent care and was referred to [facility] ER for DVT rule out on 3/13/21   Complaint was right ankle pain, had noticed swelling on 3/10/21,   no imaging completed  d dimer negative   states he is working out recently doing lunges and feels this is when he injured his ankle   States that pain shoot to the bottom of the foot.              Past Medical History  Disease Name Date Onset Notes   Allergic rhinitis --  --    Depression 06/20/2018 --    GERD --  --    Hyperlipidemia 08/22/2017 --    Hypertension --  --          Past Surgical History  Procedure Name Date Notes   *No Past Surgical History --  --          Medication List  Name Date Started Instructions   atenolol 25 mg oral tablet 10/29/2020 TAKE 1 TABLET (25 MG) BY ORAL ROUTE ONCE DAILY FOR 90 DAYS for 90 days   Ativan 1 mg oral tablet 10/29/2020 take 1 tablet (1 mg) by oral route 2 times per day as needed for 90 days   fexofenadine 180 mg oral tablet 10/29/2020 take 1 tablet (180 mg) by oral route once daily for 90 days   hydrocodone-acetaminophen 5-325 mg oral tablet  take 1 tablet by oral route every 6 hours as needed for pain         Allergy List  Allergen Name Date Reaction Notes   NO KNOWN DRUG ALLERGIES --  --  --          Family Medical History  Disease Name Relative/Age Notes   Lung Neoplasm, Malignant Father/   --    Colon Cancer  grandparents/not specified  "        Social History  Finding Status Start/Stop Quantity Notes   Alcohol Current some day 21/-- 4 cans per mon. only drinks on ocassions/4 cans per month if that   Tobacco Former 14/42 1/2 ppd quit smoking a month ago         Immunizations  NameDate Admin Mfg Trade Name Lot Number Route Inj VIS Given VIS Publication   Bbmrdrtfh25/20/2014 SKB [employer] quadrivalent [id number] IM LD 10/20/2014 07/02/2012   Comments:    Tdap10/20/2014 SKB BOOSTRIX [id number] IM RA 10/20/2014 01/24/2012   Comments:          Review of Systems  · Constitutional  o Denies  o : fatigue, fever, weight gain, weight loss, chills  · Eyes  o Denies  o : changes in vision  · HENT  o Denies  o : ear pain, sore throat  · Cardiovascular  o Denies  o : chest Pain, palpitations, edema (swelling)  · Respiratory  o Denies  o : frequent cough, shortness of breath  · Gastrointestinal  o Denies  o : nausea, vomiting, changes in bowel habits  · Genitourinary  o Denies  o : dysuria, urinary frequency, urinary urgency, polyuria  · Integument  o Denies  o : rash  · Neurologic  o Denies  o : headache, tingling or numbness, dizziness  · Musculoskeletal  o Denies  o : joint pain, myalgias  · Endocrine  o Denies  o : polydipsia, polyphagia  · Psychiatric  o Denies  o : mood changes, memory changes, SI/HI  · Heme-Lymph  o Denies  o : easy bruising, easy bleeding, lymph node enlargement or tenderness  · Allergic-Immunologic  o Denies  o : eczema, seasonal allergies, urticaria      Vitals  Date Time BP Position Site L\R Cuff Size HR RR TEMP (F) WT  HT  BMI kg/m2 BSA m2 O2 Sat FR L/min FiO2        10/29/2020 08:02 /73 Sitting    74 - R 24 98.9 207lbs 4oz 5'  9\" 30.61 2.14 98 %  21%    02/04/2021 02:52 /82 Sitting                 03/18/2021 03:11 /80 Sitting    64 - R 18 99.1 217lbs 0oz 5'  9\" 32.04 2.19 99 %            Physical Examination  · Constitutional  o Appearance  o : well-nourished, in no acute distress  · Respiratory  o Respiratory Effort  o : " breathing unlabored  o Auscultation of Lungs  o : normal breath sounds throughout inspiration and expiration  · Cardiovascular  o Heart  o :   § Auscultation of Heart  § : regular rate and rhythm, no murmurs  o Peripheral Vascular System  o :   § Pedal Pulses  § : pulses 2 bilaterally  § Extremities  § : no clubbing or edema  · Musculoskeletal  o Right Lower Extremity  o :   § Inspection/Palpation  § : lateral ankle mild tenderness to palpation, swelling noted, no erythema, no ecchymosis  o Left Lower Extremity  o :   § Inspection/Palpation  § : no joint or limb tenderness to palpation, ROM normal  · Skin and Subcutaneous Tissue  o General Inspection  o : pink, warm, dry   · Neurologic  o Gait and Station  o : normal gait, able to stand without difficulty              Assessment  · Ankle pain, right     719.47/M25.571  will obtain xray   · Swelling of right ankle joint     719.07/M25.471  ice, elevate, and apply ace wrap       Plan  · Orders  o ACO-39: Current medications updated and reviewed (, 1159F) - - 03/18/2021  o Ankle (Right) 3 views X-Ray [facility] ([id number]) - 719.47/M25.571, 719.07/M25.471 - 03/18/2021  · Medications  o Medications have been Reconciled  o Transition of Care or Provider Policy  · Instructions  o Patient was educated/instructed on their diagnosis, treatment and medications prior to discharge from the clinic today.  · Disposition  o will contact with diagnostics results  o FOLLOW UP PENDING RESULTS            Electronically Signed by: FARA Moreno -Author on March 18, 2021 03:33:22 PM

## 2021-05-15 VITALS
SYSTOLIC BLOOD PRESSURE: 117 MMHG | OXYGEN SATURATION: 98 % | TEMPERATURE: 97.1 F | HEIGHT: 69 IN | RESPIRATION RATE: 18 BRPM | WEIGHT: 183 LBS | BODY MASS INDEX: 27.11 KG/M2 | HEART RATE: 56 BPM | DIASTOLIC BLOOD PRESSURE: 77 MMHG

## 2021-05-15 VITALS
OXYGEN SATURATION: 99 % | DIASTOLIC BLOOD PRESSURE: 79 MMHG | HEART RATE: 57 BPM | BODY MASS INDEX: 27.55 KG/M2 | SYSTOLIC BLOOD PRESSURE: 125 MMHG | WEIGHT: 186 LBS | TEMPERATURE: 98.4 F | HEIGHT: 69 IN | RESPIRATION RATE: 18 BRPM

## 2021-05-16 VITALS
DIASTOLIC BLOOD PRESSURE: 77 MMHG | WEIGHT: 177 LBS | OXYGEN SATURATION: 100 % | TEMPERATURE: 98.5 F | HEIGHT: 69 IN | BODY MASS INDEX: 26.22 KG/M2 | RESPIRATION RATE: 16 BRPM | SYSTOLIC BLOOD PRESSURE: 131 MMHG | HEART RATE: 88 BPM

## 2021-05-16 VITALS
HEART RATE: 77 BPM | DIASTOLIC BLOOD PRESSURE: 62 MMHG | SYSTOLIC BLOOD PRESSURE: 120 MMHG | RESPIRATION RATE: 16 BRPM | HEIGHT: 69 IN | WEIGHT: 164 LBS | BODY MASS INDEX: 24.29 KG/M2

## 2021-05-16 VITALS
OXYGEN SATURATION: 99 % | TEMPERATURE: 98.7 F | HEIGHT: 69 IN | HEART RATE: 61 BPM | BODY MASS INDEX: 23.7 KG/M2 | WEIGHT: 160 LBS | SYSTOLIC BLOOD PRESSURE: 115 MMHG | DIASTOLIC BLOOD PRESSURE: 62 MMHG

## 2021-05-16 VITALS
HEIGHT: 69 IN | TEMPERATURE: 98 F | OXYGEN SATURATION: 98 % | RESPIRATION RATE: 19 BRPM | WEIGHT: 165 LBS | HEART RATE: 59 BPM | SYSTOLIC BLOOD PRESSURE: 103 MMHG | BODY MASS INDEX: 24.44 KG/M2 | DIASTOLIC BLOOD PRESSURE: 57 MMHG

## 2021-05-21 ENCOUNTER — HOSPITAL ENCOUNTER (OUTPATIENT)
Dept: URGENT CARE | Facility: CLINIC | Age: 46
Discharge: HOME | End: 2021-05-21
Attending: NURSE PRACTITIONER

## 2021-05-28 ENCOUNTER — OFFICE VISIT CONVERTED (OUTPATIENT)
Dept: PODIATRY | Facility: CLINIC | Age: 46
End: 2021-05-28
Attending: PODIATRIST

## 2021-06-05 NOTE — PROGRESS NOTES
Progress Note      Patient Name: Avtar Chapman   Patient ID: [id number]   Sex: Male   YOB: 1975    Primary Care Provider: Estevan CHAUDHARI   Referring Provider: Estevan CHAUDHARI    Visit Date: May 28, 2021    Provider: Kevin Paul DPM   [place]: [facility]   Location Address: [address] [place] [address]  [id number]   Location Phone: [phone]          Chief Complaint  · Right Foot Pain      History Of Present Illness  Avtar Chapman is a 45 year old /White male who presents to the [facility] today a follow up for:      New, Established, New Problem:  est  Location:  Right heel  Duration:  early May 2021, but states he has some symptoms in March 2021.  There were ruled out at DVT.  Onset:  gradual  Nature:  achy, sharp, shooting  Stable, worsening, improving:  improving  Aggravating factors:    Patient describes morning Right heel pain as stabbing, burning, or aching. This pain usually subsides throughout the day, however it returns afterperiods of rest andsitting, when standing back up on their feet,and again the next morning.    Previous Treatment:  x-ray, arch, Medrol divine    Patient denies any fevers, chills, nausea, vomiting, shortness of breathe, nor any other constitutional signs nor symptoms.    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       Past Medical History  Allergic rhinitis; Depression; Foot pain; GERD; Hyperlipidemia; Hypertension         Past Surgical History  *No Past Surgical History         Medication List  atenolol 25 mg oral tablet; hydrocodone-acetaminophen 5-325 mg oral tablet         Allergy List  NO KNOWN DRUG ALLERGIES       Allergies Reconciled  Family Medical History  Lung Neoplasm, Malignant; Colon Cancer         Social History  Alcohol (Current some day); Tobacco (Former)         Immunizations  Name Date Admin   Influenza 10/20/2014   Tdap 10/20/2014         Review of  "Systems  · Constitutional  o Denies  o : fatigue, night sweats  · Eyes  o Denies  o : double vision, blurred vision  · HENT  o Denies  o : vertigo, recent head injury  · Cardiovascular  o Denies  o : chest pain, irregular heart beats  · Respiratory  o Denies  o : shortness of breath, productive cough  · Gastrointestinal  o Denies  o : nausea, vomiting  · Genitourinary  o Denies  o : dysuria, urinary retention  · Integument  o Denies  o : hair growth change, new skin lesions  · Neurologic  o Denies  o : altered mental status, seizures  · Musculoskeletal  o * See HPI  · Endocrine  o Denies  o : cold intolerance, heat intolerance  · Heme-Lymph  o Denies  o : petechiae, lymph node enlargement or tenderness  · Allergic-Immunologic  o Denies  o : frequent illnesses      Vitals  Date Time BP Position Site L\R Cuff Size HR RR TEMP (F) WT  HT  BMI kg/m2 BSA m2 O2 Sat FR L/min FiO2 HC       05/28/2021 07:45 /78 Sitting    80 - R  97.5 221lbs 0oz 5'  9\" 32.64 2.21 99 %      05/28/2021 07:45 /82 Sitting                       Physical Examination  · Constitutional  o Appearance  o : Awake, alert, well developed, well nourished and well groomed  · Cardiovascular  o Peripheral Vascular System  o :   § Pedal Pulses  § : Pedal Pulses are +2 and symmetrical   § Extremities  § : No edema of the lower extremities  · Skin and Subcutaneous Tissue  o General Inspection  o : Skin is noted to have normal texture and turgor, with no excresences noted.  o Digits and Nails  o : The tonails are noted to be without disease.  · Neurologic  o Sensation  o : Epicritic sensations intact bilaterally.  · Right Ankle/Foot  o Inspection  o : Mild tenderness to palpaiton to the medial tubercle of the calcaneus. No signs of edema, erythema, lymphangitis, nor signs of infection.           Assessment  · Foot pain, right     729.5/M79.671  · Plantar fascial fibromatosis of right foot     728.71/M72.2      Plan  · Medications  o Medications have " been Reconciled  o Transition of Care or Provider Policy  · Instructions  o Overview: This patient has a plantar fasciitis.  o Discuss Findings: I have discussed the findings of this evaluation with the patient. The discussion included a complete verbal explanation of any changes in the examination results, diagnosis, and the current treatment plan. A schedule for future care needs was explained. If any questions should arise after returning home, I have encouraged the patient to feel free to contact Dr. Paul. The patient states understanding and agreement with this plan.  o Monitor For Problems: Pt to monitor for problems and to contact Dr. Paul for follow-up should such signs occur. Patient states understanding and agreement with this plan.  o Treatement Alternatives: Nonsurgical treatments almost always improve the pain. Treatment can last from several months to 2 years before symptoms get better. Most patients feel better in 9 months. Rarely Some people need surgery to relieve the pain.  o Conservative Treatment Recommendations: Anti-inflammatory medication to reduce pain and inflammation, Heel stretching exercises,   o Follow Up PRN.  o Discussed proper shoegear for the patient's feet and medical condition.  o Electronically Identified Patient Education Materials Provided Electronically  · Disposition  o Call or Return if symptoms worsen or persist.  · Referrals  o ID: [id number] Date: 05/03/2021 Type: Inbound  Specialty: Podiatry            Electronically Signed by: Kevin Paul DPM -Author on May 28, 2021 07:56:52 AM

## 2021-07-15 VITALS
HEIGHT: 69 IN | DIASTOLIC BLOOD PRESSURE: 81 MMHG | SYSTOLIC BLOOD PRESSURE: 139 MMHG | WEIGHT: 222 LBS | TEMPERATURE: 97.6 F | HEART RATE: 72 BPM | OXYGEN SATURATION: 99 % | BODY MASS INDEX: 32.88 KG/M2

## 2021-07-15 VITALS
BODY MASS INDEX: 32.73 KG/M2 | HEIGHT: 69 IN | SYSTOLIC BLOOD PRESSURE: 144 MMHG | WEIGHT: 221 LBS | DIASTOLIC BLOOD PRESSURE: 78 MMHG | HEART RATE: 80 BPM | TEMPERATURE: 97.5 F | OXYGEN SATURATION: 99 %

## 2021-08-04 ENCOUNTER — OFFICE VISIT (OUTPATIENT)
Dept: FAMILY MEDICINE CLINIC | Facility: CLINIC | Age: 46
End: 2021-08-04

## 2021-08-04 VITALS
DIASTOLIC BLOOD PRESSURE: 88 MMHG | HEART RATE: 71 BPM | SYSTOLIC BLOOD PRESSURE: 130 MMHG | OXYGEN SATURATION: 96 % | WEIGHT: 220 LBS | HEIGHT: 69 IN | BODY MASS INDEX: 32.58 KG/M2 | TEMPERATURE: 96.7 F

## 2021-08-04 DIAGNOSIS — G89.4 CHRONIC PAIN DISORDER: ICD-10-CM

## 2021-08-04 DIAGNOSIS — K21.9 GASTROESOPHAGEAL REFLUX DISEASE WITHOUT ESOPHAGITIS: ICD-10-CM

## 2021-08-04 DIAGNOSIS — I10 HYPERTENSION, UNSPECIFIED TYPE: Primary | ICD-10-CM

## 2021-08-04 DIAGNOSIS — E29.1 HYPOGONADISM IN MALE: ICD-10-CM

## 2021-08-04 DIAGNOSIS — J30.9 ALLERGIC RHINITIS, UNSPECIFIED SEASONALITY, UNSPECIFIED TRIGGER: ICD-10-CM

## 2021-08-04 DIAGNOSIS — L98.9 SKIN LESION OF BACK: ICD-10-CM

## 2021-08-04 DIAGNOSIS — E78.5 HYPERLIPIDEMIA, UNSPECIFIED HYPERLIPIDEMIA TYPE: ICD-10-CM

## 2021-08-04 PROBLEM — F32.A DEPRESSION: Status: ACTIVE | Noted: 2018-06-20

## 2021-08-04 PROBLEM — M50.30 DDD (DEGENERATIVE DISC DISEASE), CERVICAL: Status: ACTIVE | Noted: 2021-08-04

## 2021-08-04 LAB
ALBUMIN SERPL-MCNC: 4.3 G/DL (ref 3.5–5.2)
ALBUMIN/GLOB SERPL: 1.7 G/DL
ALP SERPL-CCNC: 52 U/L (ref 39–117)
ALT SERPL W P-5'-P-CCNC: 26 U/L (ref 1–41)
ANION GAP SERPL CALCULATED.3IONS-SCNC: 10 MMOL/L (ref 5–15)
AST SERPL-CCNC: 18 U/L (ref 1–40)
BASOPHILS # BLD AUTO: 0.08 10*3/MM3 (ref 0–0.2)
BASOPHILS NFR BLD AUTO: 1 % (ref 0–1.5)
BILIRUB SERPL-MCNC: 0.5 MG/DL (ref 0–1.2)
BUN SERPL-MCNC: 10 MG/DL (ref 6–20)
BUN/CREAT SERPL: 8.8 (ref 7–25)
CALCIUM SPEC-SCNC: 9.3 MG/DL (ref 8.6–10.5)
CHLORIDE SERPL-SCNC: 102 MMOL/L (ref 98–107)
CHOLEST SERPL-MCNC: 163 MG/DL (ref 0–200)
CO2 SERPL-SCNC: 26 MMOL/L (ref 22–29)
CREAT SERPL-MCNC: 1.13 MG/DL (ref 0.76–1.27)
DEPRECATED RDW RBC AUTO: 39.9 FL (ref 37–54)
EOSINOPHIL # BLD AUTO: 0.21 10*3/MM3 (ref 0–0.4)
EOSINOPHIL NFR BLD AUTO: 2.5 % (ref 0.3–6.2)
ERYTHROCYTE [DISTWIDTH] IN BLOOD BY AUTOMATED COUNT: 13 % (ref 12.3–15.4)
GFR SERPL CREATININE-BSD FRML MDRD: 70 ML/MIN/1.73
GLOBULIN UR ELPH-MCNC: 2.6 GM/DL
GLUCOSE SERPL-MCNC: 81 MG/DL (ref 65–99)
HCT VFR BLD AUTO: 46.4 % (ref 37.5–51)
HDLC SERPL-MCNC: 25 MG/DL (ref 40–60)
HGB BLD-MCNC: 15.7 G/DL (ref 13–17.7)
IMM GRANULOCYTES # BLD AUTO: 0.03 10*3/MM3 (ref 0–0.05)
IMM GRANULOCYTES NFR BLD AUTO: 0.4 % (ref 0–0.5)
LDLC SERPL CALC-MCNC: 91 MG/DL (ref 0–100)
LDLC/HDLC SERPL: 3.27 {RATIO}
LYMPHOCYTES # BLD AUTO: 2.47 10*3/MM3 (ref 0.7–3.1)
LYMPHOCYTES NFR BLD AUTO: 29.8 % (ref 19.6–45.3)
MCH RBC QN AUTO: 29 PG (ref 26.6–33)
MCHC RBC AUTO-ENTMCNC: 33.8 G/DL (ref 31.5–35.7)
MCV RBC AUTO: 85.8 FL (ref 79–97)
MONOCYTES # BLD AUTO: 0.8 10*3/MM3 (ref 0.1–0.9)
MONOCYTES NFR BLD AUTO: 9.6 % (ref 5–12)
NEUTROPHILS NFR BLD AUTO: 4.71 10*3/MM3 (ref 1.7–7)
NEUTROPHILS NFR BLD AUTO: 56.7 % (ref 42.7–76)
NRBC BLD AUTO-RTO: 0 /100 WBC (ref 0–0.2)
PLATELET # BLD AUTO: 312 10*3/MM3 (ref 140–450)
PMV BLD AUTO: 10.2 FL (ref 6–12)
POTASSIUM SERPL-SCNC: 4 MMOL/L (ref 3.5–5.2)
PROT SERPL-MCNC: 6.9 G/DL (ref 6–8.5)
RBC # BLD AUTO: 5.41 10*6/MM3 (ref 4.14–5.8)
SODIUM SERPL-SCNC: 138 MMOL/L (ref 136–145)
TRIGL SERPL-MCNC: 281 MG/DL (ref 0–150)
TSH SERPL DL<=0.05 MIU/L-ACNC: 1.12 UIU/ML (ref 0.27–4.2)
VLDLC SERPL-MCNC: 47 MG/DL (ref 5–40)
WBC # BLD AUTO: 8.3 10*3/MM3 (ref 3.4–10.8)

## 2021-08-04 PROCEDURE — 80061 LIPID PANEL: CPT | Performed by: NURSE PRACTITIONER

## 2021-08-04 PROCEDURE — 84403 ASSAY OF TOTAL TESTOSTERONE: CPT | Performed by: NURSE PRACTITIONER

## 2021-08-04 PROCEDURE — 84402 ASSAY OF FREE TESTOSTERONE: CPT | Performed by: NURSE PRACTITIONER

## 2021-08-04 PROCEDURE — 99214 OFFICE O/P EST MOD 30 MIN: CPT | Performed by: NURSE PRACTITIONER

## 2021-08-04 PROCEDURE — 84443 ASSAY THYROID STIM HORMONE: CPT | Performed by: NURSE PRACTITIONER

## 2021-08-04 PROCEDURE — 85025 COMPLETE CBC W/AUTO DIFF WBC: CPT | Performed by: NURSE PRACTITIONER

## 2021-08-04 PROCEDURE — 80053 COMPREHEN METABOLIC PANEL: CPT | Performed by: NURSE PRACTITIONER

## 2021-08-04 RX ORDER — METHYLPREDNISOLONE 4 MG/1
TABLET ORAL
COMMUNITY
Start: 2021-05-07 | End: 2021-08-04

## 2021-08-04 RX ORDER — ATENOLOL 25 MG/1
25 TABLET ORAL DAILY
Qty: 90 TABLET | Refills: 1 | Status: SHIPPED | OUTPATIENT
Start: 2021-08-04 | End: 2021-12-28 | Stop reason: SDUPTHER

## 2021-08-04 RX ORDER — GABAPENTIN 300 MG/1
CAPSULE ORAL
COMMUNITY
End: 2021-08-04

## 2021-08-04 RX ORDER — ATENOLOL 25 MG/1
TABLET ORAL
COMMUNITY
Start: 2021-03-29 | End: 2021-08-04 | Stop reason: SDUPTHER

## 2021-08-04 NOTE — PROGRESS NOTES
Follow Up Office Visit      Patient Name: Avtar Chapman  : 1975   MRN: [id number]     Chief Complaint:    Chief Complaint   Patient presents with   • Follow-up   • Hypertension   • Hyperlipidemia   • Allergic Rhinitis       History of Present Illness: Avtar Chapman is a 45 y.o. male who is here today for  Follow up htn, hyperlipidemia, low testosterone, gerd, and seasonal allergies  Receiving replacement from clinic in [place] with telehealth visits  Chronic back pain [facility]   Pt reports he has not taken his atenolol today     C/O- 2 itchy moles on back, pt noticed them a few months ago, irritated   wants to recheck T levels      Subjective      Review of Systems:   Review of Systems   Constitutional: Negative for chills and fever.   HENT: Negative for ear pain, sinus pain and sore throat.    Eyes: Negative for discharge and itching.   Respiratory: Negative for cough and shortness of breath.    Cardiovascular: Negative for chest pain.   Gastrointestinal: Negative for abdominal pain, diarrhea, nausea and vomiting.   Genitourinary: Negative for dysuria.   Musculoskeletal: Positive for back pain.   Skin: Negative for rash.        Skin lesions on back    Allergic/Immunologic: Positive for environmental allergies.   Neurological: Negative for headaches.        Past Medical History:   Past Medical History:   Diagnosis Date   • Allergic rhinitis    • Depression 2018   • Foot pain    • GERD (gastroesophageal reflux disease)    • Hyperlipidemia 2017   • Hypertension    • Testosterone deficiency        Past Surgical History: No past surgical history on file.    Family History:   Family History   Problem Relation Age of Onset   • No Known Problems Mother    • Cancer Father    • Lung cancer Father    • Colon cancer Other        Social History:   Social History     Socioeconomic History   • Marital status: Single     Spouse name: Not on file   • Number of children: Not on file  "  • Years of education: Not on file   • Highest education level: Not on file   Tobacco Use   • Smoking status: Former Smoker     Packs/day: 0.50     Start date: 1990     Quit date: 2018     Years since quitting: 3.5   • Smokeless tobacco: Never Used   • Tobacco comment: quit smoking a month ago quit 2019   Substance and Sexual Activity   • Alcohol use: Yes     Comment: started drinking at 22, 4 cans per month, only drinks on occasions   • Drug use: Never   • Sexual activity: Defer       Medications:     Current Outpatient Medications:   •  atenolol (TENORMIN) 25 MG tablet, Take 1 tablet by mouth Daily., Disp: 90 tablet, Rfl: 1  •  HYDROcodone-acetaminophen (NORCO) 5-325 MG per tablet, Take 1 tablet by mouth Every 12 (Twelve) Hours As Needed., Disp: , Rfl:   •  meloxicam (MOBIC) 15 MG tablet, Take 15 mg by mouth Daily As Needed., Disp: , Rfl:     Allergies:   No Known Allergies      Objective     Physical Exam:  Vital Signs:   Vitals:    08/04/21 0706   BP: 141/84   Pulse: 71   Temp: 96.7 °F (35.9 °C)   SpO2: 96%   Weight: 99.8 kg (220 lb)   Height: 175.3 cm (69\")     Body mass index is 32.49 kg/m².     Physical Exam  HENT:      Head: Normocephalic.      Right Ear: Tympanic membrane normal.      Left Ear: Tympanic membrane normal.      Nose: Nose normal.      Mouth/Throat:      Mouth: Mucous membranes are moist.   Eyes:      Conjunctiva/sclera: Conjunctivae normal.      Pupils: Pupils are equal, round, and reactive to light.   Neck:      Vascular: No carotid bruit.   Cardiovascular:      Rate and Rhythm: Normal rate and regular rhythm.      Heart sounds: Normal heart sounds. No murmur heard.     Pulmonary:      Effort: Pulmonary effort is normal.      Breath sounds: Normal breath sounds.   Abdominal:      General: Bowel sounds are normal.      Palpations: Abdomen is soft.   Musculoskeletal:      Cervical back: Neck supple.      Right lower leg: No edema.      Left lower leg: No edema.   Skin:     General: Skin is " warm and dry.      Capillary Refill: Capillary refill takes less than 2 seconds.      Comments: Multiple Tan lesions flat on mid back nontender no erythema    Neurological:      Mental Status: He is alert and oriented to person, place, and time.   Psychiatric:         Mood and Affect: Mood normal.         Behavior: Behavior normal.             Assessment / Plan      Assessment/Plan:   Diagnoses and all orders for this visit:    1. Hypertension, unspecified type (Primary)  -     CBC Auto Differential  -     Comprehensive Metabolic Panel  -     Lipid Panel; Future  -     TSH    2. Gastroesophageal reflux disease without esophagitis  -     CBC Auto Differential  -     Comprehensive Metabolic Panel  -     Lipid Panel; Future  -     TSH    3. Hyperlipidemia, unspecified hyperlipidemia type  -     CBC Auto Differential  -     Comprehensive Metabolic Panel  -     Lipid Panel; Future  -     TSH    4. Chronic pain disorder  -     CBC Auto Differential  -     Comprehensive Metabolic Panel  -     Lipid Panel; Future  -     TSH    5. Allergic rhinitis, unspecified seasonality, unspecified trigger  -     CBC Auto Differential  -     Comprehensive Metabolic Panel  -     Lipid Panel; Future  -     TSH    6. Hypogonadism in male  -     CBC Auto Differential  -     Comprehensive Metabolic Panel  -     Lipid Panel; Future  -     TSH  -     Testosterone, Free, Total    7. Skin lesion of back  -     Ambulatory Referral to Dermatology    Other orders  -     atenolol (TENORMIN) 25 MG tablet; Take 1 tablet by mouth Daily.  Dispense: 90 tablet; Refill: 1       htn elevated in clinic will recheck manaully, improved recommend decrease in weight and exercise daily   gerd controlled diet   Hyperlipidemia will obtain lipid panel to monitor  Chronic pain disorder continue follow up with pain management   Allergic rhinitis controlled with otc antihistamine prn  Hypogonadism using testosterone replacement at this time will monitor and call with  results  Will refer to dermatology for multiple lesions per patients requests       Follow Up:   Return in about 6 months (around 2/4/2022).    FARA Clark      Please note that portions of this note may have been completed with a voice recognition program. Efforts were made to edit the dictations, but occasionally words are mistranscribed.

## 2021-08-06 ENCOUNTER — TELEPHONE (OUTPATIENT)
Dept: FAMILY MEDICINE CLINIC | Facility: CLINIC | Age: 46
End: 2021-08-06

## 2021-08-06 NOTE — TELEPHONE ENCOUNTER
Caller: Avtar Chapman    Relationship: Self    Best call back number: [phone]    What is the best time to reach you: ANY    Who are you requesting to speak with (clinical staff, provider,  specific staff member): CLINICAL STAFF    What was the call regarding: PATIENT CALLED STATING HE MISSED A CALL FROM THE OFFICE REGARDING HIS LAB RESULTS    Do you require a callback: YES

## 2021-08-09 LAB
TESTOST FREE SERPL-MCNC: 40.6 PG/ML (ref 6.8–21.5)
TESTOST SERPL-MCNC: >1500 NG/DL (ref 264–916)

## 2021-09-20 ENCOUNTER — OFFICE VISIT (OUTPATIENT)
Dept: FAMILY MEDICINE CLINIC | Facility: CLINIC | Age: 46
End: 2021-09-20

## 2021-09-20 VITALS
WEIGHT: 220 LBS | BODY MASS INDEX: 32.58 KG/M2 | OXYGEN SATURATION: 99 % | HEART RATE: 73 BPM | DIASTOLIC BLOOD PRESSURE: 83 MMHG | TEMPERATURE: 98.7 F | SYSTOLIC BLOOD PRESSURE: 128 MMHG | HEIGHT: 69 IN

## 2021-09-20 DIAGNOSIS — R52 BODY ACHES: ICD-10-CM

## 2021-09-20 DIAGNOSIS — R68.83 CHILLS: Primary | ICD-10-CM

## 2021-09-20 LAB
EXPIRATION DATE: 0
FLUAV AG NPH QL: NEGATIVE
FLUBV AG NPH QL: NEGATIVE
INTERNAL CONTROL: NORMAL
Lab: 0
S PYO AG THROAT QL: NEGATIVE
SARS-COV-2 AG UPPER RESP QL IA.RAPID: NOT DETECTED

## 2021-09-20 PROCEDURE — 99213 OFFICE O/P EST LOW 20 MIN: CPT | Performed by: NURSE PRACTITIONER

## 2021-09-20 PROCEDURE — 87426 SARSCOV CORONAVIRUS AG IA: CPT | Performed by: NURSE PRACTITIONER

## 2021-09-20 PROCEDURE — 87880 STREP A ASSAY W/OPTIC: CPT | Performed by: NURSE PRACTITIONER

## 2021-09-20 PROCEDURE — U0003 INFECTIOUS AGENT DETECTION BY NUCLEIC ACID (DNA OR RNA); SEVERE ACUTE RESPIRATORY SYNDROME CORONAVIRUS 2 (SARS-COV-2) (CORONAVIRUS DISEASE [COVID-19]), AMPLIFIED PROBE TECHNIQUE, MAKING USE OF HIGH THROUGHPUT TECHNOLOGIES AS DESCRIBED BY CMS-2020-01-R: HCPCS | Performed by: NURSE PRACTITIONER

## 2021-09-20 PROCEDURE — 87804 INFLUENZA ASSAY W/OPTIC: CPT | Performed by: NURSE PRACTITIONER

## 2021-09-20 NOTE — PROGRESS NOTES
Follow Up Office Visit      Patient Name: Avtar Chapman  : 1975   MRN: [id number]     Chief Complaint:    Chief Complaint   Patient presents with   • Chills     History of Present Illness: Avtar Chapman is a 45 y.o. male who is here today for body aches and chills     C/O-exposure from work,  he was having diarrhea and chills,  Denies loss of taste and smell   No covid vaccines   Subjective      Review of Systems:   Review of Systems   Constitutional: Positive for chills and diaphoresis.   HENT: Positive for postnasal drip. Negative for ear pain, sinus pain and sore throat.    Eyes: Negative for discharge.   Respiratory: Positive for cough.    Cardiovascular: Negative for chest pain.   Gastrointestinal: Positive for diarrhea. Negative for abdominal pain, nausea and vomiting.   Musculoskeletal: Positive for arthralgias and myalgias.   Skin: Negative for rash.   Neurological: Negative for dizziness and headaches.        Past Medical History:   Past Medical History:   Diagnosis Date   • Allergic rhinitis    • Depression 2018   • Foot pain    • GERD (gastroesophageal reflux disease)    • Hyperlipidemia 2017   • Hypertension    • Testosterone deficiency        Past Surgical History: No past surgical history on file.    Family History:   Family History   Problem Relation Age of Onset   • No Known Problems Mother    • Cancer Father    • Lung cancer Father    • Colon cancer Other        Social History:   Social History     Socioeconomic History   • Marital status: Single     Spouse name: Not on file   • Number of children: Not on file   • Years of education: Not on file   • Highest education level: Not on file   Tobacco Use   • Smoking status: Former Smoker     Packs/day: 0.50     Start date:      Quit date: 2018     Years since quitting: 3.7   • Smokeless tobacco: Never Used   • Tobacco comment: quit smoking a month ago quit 2019   Substance and Sexual Activity   • Alcohol use:  Yes     Comment: started drinking at 22, 4 cans per month, only drinks on occasions   • Drug use: Never   • Sexual activity: Defer       Medications:     Current Outpatient Medications:   •  atenolol (TENORMIN) 25 MG tablet, Take 1 tablet by mouth Daily., Disp: 90 tablet, Rfl: 1  •  HYDROcodone-acetaminophen (NORCO) 5-325 MG per tablet, Take 1 tablet by mouth Every 12 (Twelve) Hours As Needed., Disp: , Rfl:   •  meloxicam (MOBIC) 15 MG tablet, Take 15 mg by mouth Daily As Needed., Disp: , Rfl:     Allergies:   No Known Allergies      Objective     Physical Exam:  Vital Signs: There were no vitals filed for this visit.  There is no height or weight on file to calculate BMI.     Physical Exam  HENT:      Right Ear: Tympanic membrane normal.      Left Ear: Tympanic membrane normal.      Nose: Congestion and rhinorrhea present.      Mouth/Throat:      Mouth: Mucous membranes are moist.   Eyes:      Conjunctiva/sclera: Conjunctivae normal.      Pupils: Pupils are equal, round, and reactive to light.   Cardiovascular:      Rate and Rhythm: Normal rate and regular rhythm.      Heart sounds: Normal heart sounds. No murmur heard.     Pulmonary:      Effort: Pulmonary effort is normal.      Breath sounds: Normal breath sounds.   Abdominal:      General: Bowel sounds are normal.      Palpations: Abdomen is soft.   Musculoskeletal:      Cervical back: Neck supple.   Lymphadenopathy:      Cervical: No cervical adenopathy.   Skin:     General: Skin is warm and dry.   Neurological:      Mental Status: He is alert.             Assessment / Plan      Assessment/Plan:   Diagnoses and all orders for this visit:    1. Chills (Primary)  -     POCT rapid strep A  -     POCT Influenza A/B  -     POCT SARS-CoV-2 Antigen CARLOS    2. Body aches    will obtain flu, covid, and strep swab in office all negative  Will send swab to [facility] to confirm       Follow Up:   Return if symptoms worsen or fail to improve.    Reinaldo Lezama,  APRN      Please note that portions of this note may have been completed with a voice recognition program. Efforts were made to edit the dictations, but occasionally words are mistranscribed.

## 2021-09-21 LAB — SARS-COV-2 RNA RESP QL NAA+PROBE: NOT DETECTED

## 2021-09-22 ENCOUNTER — TELEPHONE (OUTPATIENT)
Dept: FAMILY MEDICINE CLINIC | Facility: CLINIC | Age: 46
End: 2021-09-22

## 2021-09-22 NOTE — TELEPHONE ENCOUNTER
yancy and left msg for patient to call the office for results, Patient is on mychart and may have seen results already

## 2021-12-28 RX ORDER — ATENOLOL 25 MG/1
25 TABLET ORAL DAILY
Qty: 90 TABLET | Refills: 1 | Status: SHIPPED | OUTPATIENT
Start: 2021-12-28 | End: 2022-08-05 | Stop reason: SDUPTHER

## 2022-01-03 ENCOUNTER — OFFICE VISIT (OUTPATIENT)
Dept: PODIATRY | Facility: CLINIC | Age: 47
End: 2022-01-03

## 2022-01-03 VITALS
OXYGEN SATURATION: 100 % | DIASTOLIC BLOOD PRESSURE: 85 MMHG | SYSTOLIC BLOOD PRESSURE: 151 MMHG | HEIGHT: 69 IN | WEIGHT: 215.2 LBS | TEMPERATURE: 97.3 F | BODY MASS INDEX: 31.87 KG/M2 | HEART RATE: 77 BPM

## 2022-01-03 DIAGNOSIS — M79.671 FOOT PAIN, RIGHT: Primary | ICD-10-CM

## 2022-01-03 DIAGNOSIS — M72.2 PLANTAR FASCIITIS: ICD-10-CM

## 2022-01-03 PROCEDURE — 99213 OFFICE O/P EST LOW 20 MIN: CPT | Performed by: PODIATRIST

## 2022-01-03 RX ORDER — DICLOFENAC SODIUM 75 MG/1
75 TABLET, DELAYED RELEASE ORAL 2 TIMES DAILY
Qty: 60 TABLET | Refills: 1 | Status: SHIPPED | OUTPATIENT
Start: 2022-01-03 | End: 2022-02-02

## 2022-01-03 NOTE — PROGRESS NOTES
[facility] - PODIATRY    Today's Date: 22    Patient Name: Avtar Chapman  MRN: [id number]  CSN: [id number]  PCP: Estevan Lezama APRN, Last PCP Visit:  2021  Referring Provider: No ref. provider found    SUBJECTIVE     Chief Complaint   Patient presents with   • Right Foot - Follow-up     HPI: Avtar Chapman, a 46 y.o.male, comes to clinic.    New, Established, New Problem: Established    Location: Right heel    Duration: 2021    Onset:  gradual    Nature:  achy, sharp, shooting    Stable, worsening, improving: Worsening    Aggravating factors:  Patient describes morning right heel heel pain as stabbing, burning, or aching. This pain usually subsides throughout the day, however it returns after periods of rest and sitting, when standing back up on their feet, and again the next morning.      Previous Treatment: Patient states he recently started doing yoga at home.  Patient states he had adverse reactions to taking p.o. steroids previously.    Patient denies any fevers, chills, nausea, vomiting, shortness of breath, nor any other constitutional signs nor symptoms.    No other pedal complaints at this time.    Past Medical History:   Diagnosis Date   • Allergic rhinitis    • Depression 2018   • Foot pain    • GERD (gastroesophageal reflux disease)    • Hyperlipidemia 2017   • Hypertension    • Testosterone deficiency      History reviewed. No pertinent surgical history.  Family History   Problem Relation Age of Onset   • No Known Problems Mother    • Cancer Father    • Lung cancer Father    • Colon cancer Other      Social History     Socioeconomic History   • Marital status: Single   Tobacco Use   • Smoking status: Former Smoker     Packs/day: 0.50     Start date:      Quit date: 2018     Years since quittin.0   • Smokeless tobacco: Never Used   • Tobacco comment: quit smoking a month ago quit 2019   Substance and Sexual Activity   • Alcohol  use: Yes     Comment: started drinking at 22, 4 cans per month, only drinks on occasions   • Drug use: Never   • Sexual activity: Defer     No Known Allergies  Current Outpatient Medications   Medication Sig Dispense Refill   • atenolol (TENORMIN) 25 MG tablet Take 1 tablet by mouth Daily. 90 tablet 1   • HYDROcodone-acetaminophen (NORCO) 5-325 MG per tablet Take 1 tablet by mouth Every 12 (Twelve) Hours As Needed.     • meloxicam (MOBIC) 15 MG tablet Take 15 mg by mouth Daily As Needed.     • diclofenac (VOLTAREN) 75 MG EC tablet Take 1 tablet by mouth 2 (Two) Times a Day for 30 days. 60 tablet 1     No current facility-administered medications for this visit.     Review of Systems   Constitutional: Negative.    Musculoskeletal:        Right heel pain   All other systems reviewed and are negative.      OBJECTIVE     Vitals:    01/03/22 1512   BP: 151/85   Pulse: 77   Temp: 97.3 °F (36.3 °C)   SpO2: 100%       PHYSICAL EXAM     Foot/Ankle Exam:       General:   Appearance: appears stated age and healthy    Orientation: AAOx3    Affect: appropriate    Gait: unimpaired      VASCULAR      Right Foot Vascularity   Normal vascular exam    Dorsalis pedis:  2+  Posterior tibial:  2+  Skin Temperature: warm    Edema Grading:  None  CFT:  < 3 seconds  Pedal Hair Growth:  Present  Varicosities: none       Left Foot Vascularity   Normal vascular exam    Dorsalis pedis:  2+  Posterior tibial:  2+  Skin Temperature: warm    Edema Grading:  None  CFT:  < 3 seconds  Pedal Hair Growth:  Present  Varicosities: none        NEUROLOGIC     Right Foot Neurologic   Normal sensation    Light touch sensation:  Normal  Vibratory sensation:  Normal  Hot/Cold sensation: normal    Protective Sensation using Sulphur-Remy Monofilament:  10     Left Foot Neurologic   Normal sensation    Light touch sensation:  Normal  Vibratory sensation:  Normal  Hot/cold sensation: normal    Protective Sensation using Sulphur-Remy Monofilament:  10      MUSCULOSKELETAL      Right Foot Musculoskeletal   Tenderness: plantar fascia and plantar heel       MUSCLE STRENGTH     Right Foot Muscle Strength   Normal strength    Foot dorsiflexion:  5  Foot plantar flexion:  5  Foot inversion:  5  Foot eversion:  5     Left Foot Muscle Strength   Foot dorsiflexion:  5  Foot plantar flexion:  5  Foot inversion:  5  Foot eversion:  5     RANGE OF MOTION      Right Foot Range of Motion   Foot and ankle ROM within normal limits       Left Foot Range of Motion   Foot and ankle ROM within normal limits       DERMATOLOGIC     Right Foot Dermatologic   Skin: skin intact    Nails: normal       Left Foot Dermatologic   Skin: skin intact    Nails: normal        ASSESSMENT/PLAN     Diagnoses and all orders for this visit:    1. Foot pain, right (Primary)    2. Plantar fasciitis  -     diclofenac (VOLTAREN) 75 MG EC tablet; Take 1 tablet by mouth 2 (Two) Times a Day for 30 days.  Dispense: 60 tablet; Refill: 1      Comprehensive lower extremity examination and evaluation was performed.    Discussed findings and treatment plan including risks, benefits, and treatment options with patient in detail. Patient agreed with treatment plan.    Treatment Options discussed:  - no treatment at all  - change in shoegear  - change in activities  - RICE therapy  - arch support  - NSAIDs  - PO steroids  - injectable steroids    An After Visit Summary was printed and given to the patient at discharge, including (if requested) any available informative/educational handouts regarding diagnosis, treatment, or medications. All questions were answered to patient/family satisfaction. Should symptoms fail to improve or worsen they agree to call or return to clinic or to go to the Emergency Department. Discussed the importance of following up with any needed screening tests/labs/specialist appointments and any requested follow-up recommended by me today. Importance of maintaining follow-up discussed and patient  accepts that missed appointments can delay diagnosis and potentially lead to worsening of conditions.    Return if symptoms worsen or fail to improve., or sooner if acute issues arise.    This document has been electronically signed by Kevin Paul DPM on January 3, 2022 15:39 EST

## 2022-01-18 PROCEDURE — U0004 COV-19 TEST NON-CDC HGH THRU: HCPCS | Performed by: PHYSICIAN ASSISTANT

## 2022-01-19 ENCOUNTER — TELEPHONE (OUTPATIENT)
Dept: URGENT CARE | Facility: CLINIC | Age: 47
End: 2022-01-19

## 2022-01-19 NOTE — TELEPHONE ENCOUNTER
Called patient, results reviewed, symptoms are stable, continue quarantine and supportive care, follow up as needed or if symptoms worsen or persist, patient verbalizes understanding.

## 2022-02-07 ENCOUNTER — OFFICE VISIT (OUTPATIENT)
Dept: FAMILY MEDICINE CLINIC | Facility: CLINIC | Age: 47
End: 2022-02-07

## 2022-02-07 VITALS
TEMPERATURE: 97.4 F | SYSTOLIC BLOOD PRESSURE: 142 MMHG | WEIGHT: 210 LBS | DIASTOLIC BLOOD PRESSURE: 90 MMHG | HEART RATE: 60 BPM | OXYGEN SATURATION: 99 % | HEIGHT: 69 IN | BODY MASS INDEX: 31.1 KG/M2

## 2022-02-07 DIAGNOSIS — E66.9 OBESITY (BMI 30-39.9): ICD-10-CM

## 2022-02-07 DIAGNOSIS — Z13.29 SCREENING FOR THYROID DISORDER: ICD-10-CM

## 2022-02-07 DIAGNOSIS — I10 HYPERTENSION, UNSPECIFIED TYPE: ICD-10-CM

## 2022-02-07 DIAGNOSIS — E78.2 MIXED HYPERLIPIDEMIA: ICD-10-CM

## 2022-02-07 DIAGNOSIS — K21.9 GASTROESOPHAGEAL REFLUX DISEASE WITHOUT ESOPHAGITIS: ICD-10-CM

## 2022-02-07 DIAGNOSIS — J30.9 ALLERGIC RHINITIS, UNSPECIFIED SEASONALITY, UNSPECIFIED TRIGGER: ICD-10-CM

## 2022-02-07 DIAGNOSIS — R73.01 IMPAIRED FASTING GLUCOSE: ICD-10-CM

## 2022-02-07 DIAGNOSIS — G89.4 CHRONIC PAIN DISORDER: ICD-10-CM

## 2022-02-07 DIAGNOSIS — E29.1 HYPOGONADISM IN MALE: ICD-10-CM

## 2022-02-07 LAB
ALBUMIN SERPL-MCNC: 4.4 G/DL (ref 3.5–5.2)
ALBUMIN/GLOB SERPL: 1.6 G/DL
ALP SERPL-CCNC: 57 U/L (ref 39–117)
ALT SERPL W P-5'-P-CCNC: 29 U/L (ref 1–41)
ANION GAP SERPL CALCULATED.3IONS-SCNC: 10.5 MMOL/L (ref 5–15)
AST SERPL-CCNC: 26 U/L (ref 1–40)
BASOPHILS # BLD AUTO: 0.09 10*3/MM3 (ref 0–0.2)
BASOPHILS NFR BLD AUTO: 0.9 % (ref 0–1.5)
BILIRUB SERPL-MCNC: 0.6 MG/DL (ref 0–1.2)
BUN SERPL-MCNC: 10 MG/DL (ref 6–20)
BUN/CREAT SERPL: 8.8 (ref 7–25)
CALCIUM SPEC-SCNC: 9.7 MG/DL (ref 8.6–10.5)
CHLORIDE SERPL-SCNC: 100 MMOL/L (ref 98–107)
CHOLEST SERPL-MCNC: 175 MG/DL (ref 0–200)
CO2 SERPL-SCNC: 28.5 MMOL/L (ref 22–29)
CREAT SERPL-MCNC: 1.14 MG/DL (ref 0.76–1.27)
DEPRECATED RDW RBC AUTO: 41.3 FL (ref 37–54)
EOSINOPHIL # BLD AUTO: 0.23 10*3/MM3 (ref 0–0.4)
EOSINOPHIL NFR BLD AUTO: 2.4 % (ref 0.3–6.2)
ERYTHROCYTE [DISTWIDTH] IN BLOOD BY AUTOMATED COUNT: 13 % (ref 12.3–15.4)
GFR SERPL CREATININE-BSD FRML MDRD: 69 ML/MIN/1.73
GLOBULIN UR ELPH-MCNC: 2.8 GM/DL
GLUCOSE SERPL-MCNC: 75 MG/DL (ref 65–99)
HBA1C MFR BLD: 5.65 % (ref 4.8–5.6)
HCT VFR BLD AUTO: 47.5 % (ref 37.5–51)
HDLC SERPL-MCNC: 29 MG/DL (ref 40–60)
HGB BLD-MCNC: 16 G/DL (ref 13–17.7)
IMM GRANULOCYTES # BLD AUTO: 0.02 10*3/MM3 (ref 0–0.05)
IMM GRANULOCYTES NFR BLD AUTO: 0.2 % (ref 0–0.5)
LDLC SERPL CALC-MCNC: 115 MG/DL (ref 0–100)
LDLC/HDLC SERPL: 3.84 {RATIO}
LYMPHOCYTES # BLD AUTO: 2.91 10*3/MM3 (ref 0.7–3.1)
LYMPHOCYTES NFR BLD AUTO: 30.5 % (ref 19.6–45.3)
MCH RBC QN AUTO: 29.6 PG (ref 26.6–33)
MCHC RBC AUTO-ENTMCNC: 33.7 G/DL (ref 31.5–35.7)
MCV RBC AUTO: 87.8 FL (ref 79–97)
MONOCYTES # BLD AUTO: 0.9 10*3/MM3 (ref 0.1–0.9)
MONOCYTES NFR BLD AUTO: 9.4 % (ref 5–12)
NEUTROPHILS NFR BLD AUTO: 5.4 10*3/MM3 (ref 1.7–7)
NEUTROPHILS NFR BLD AUTO: 56.6 % (ref 42.7–76)
NRBC BLD AUTO-RTO: 0 /100 WBC (ref 0–0.2)
PLATELET # BLD AUTO: 339 10*3/MM3 (ref 140–450)
PMV BLD AUTO: 9.8 FL (ref 6–12)
POTASSIUM SERPL-SCNC: 4.2 MMOL/L (ref 3.5–5.2)
PROT SERPL-MCNC: 7.2 G/DL (ref 6–8.5)
PSA SERPL-MCNC: 1.05 NG/ML (ref 0–4)
RBC # BLD AUTO: 5.41 10*6/MM3 (ref 4.14–5.8)
SODIUM SERPL-SCNC: 139 MMOL/L (ref 136–145)
TRIGL SERPL-MCNC: 173 MG/DL (ref 0–150)
TSH SERPL DL<=0.05 MIU/L-ACNC: 1.52 UIU/ML (ref 0.27–4.2)
VLDLC SERPL-MCNC: 31 MG/DL (ref 5–40)
WBC NRBC COR # BLD: 9.55 10*3/MM3 (ref 3.4–10.8)

## 2022-02-07 PROCEDURE — 84403 ASSAY OF TOTAL TESTOSTERONE: CPT | Performed by: NURSE PRACTITIONER

## 2022-02-07 PROCEDURE — 85025 COMPLETE CBC W/AUTO DIFF WBC: CPT | Performed by: NURSE PRACTITIONER

## 2022-02-07 PROCEDURE — 83036 HEMOGLOBIN GLYCOSYLATED A1C: CPT | Performed by: NURSE PRACTITIONER

## 2022-02-07 PROCEDURE — 80053 COMPREHEN METABOLIC PANEL: CPT | Performed by: NURSE PRACTITIONER

## 2022-02-07 PROCEDURE — 84402 ASSAY OF FREE TESTOSTERONE: CPT | Performed by: NURSE PRACTITIONER

## 2022-02-07 PROCEDURE — 84443 ASSAY THYROID STIM HORMONE: CPT | Performed by: NURSE PRACTITIONER

## 2022-02-07 PROCEDURE — 84153 ASSAY OF PSA TOTAL: CPT | Performed by: NURSE PRACTITIONER

## 2022-02-07 PROCEDURE — 99214 OFFICE O/P EST MOD 30 MIN: CPT | Performed by: NURSE PRACTITIONER

## 2022-02-07 PROCEDURE — 80061 LIPID PANEL: CPT | Performed by: NURSE PRACTITIONER

## 2022-02-07 RX ORDER — TESTOSTERONE CYPIONATE 200 MG/ML
200 INJECTION, SOLUTION INTRAMUSCULAR
COMMUNITY

## 2022-02-07 RX ORDER — LISINOPRIL 10 MG/1
10 TABLET ORAL DAILY
Qty: 90 TABLET | Refills: 1 | Status: SHIPPED | OUTPATIENT
Start: 2022-02-07 | End: 2022-08-05 | Stop reason: SDUPTHER

## 2022-02-07 NOTE — PROGRESS NOTES
Follow Up Office Visit      Patient Name: Avtar Chapman  : 1975   MRN: [id number]     Chief Complaint:    Chief Complaint   Patient presents with   • Hypertension   • Hyperlipidemia   • Allergic Rhinitis       History of Present Illness: Avtar Chapman is a 46 y.o. male who is here today to follow up for  Hyperlipidemia, hypertension, allergic rhinitis     Pt reports positive covid test 2021, states needs a return to work note, dx positive with urgent     C/O-wanting Testosterone checked, using an online provider at this time      runny nose after eating for last year, no use of flonase or zyrtec     Getting testosterone replacement from [place]     Subjective      Review of Systems:   Review of Systems   Constitutional: Negative for chills and fever.   HENT: Positive for postnasal drip and rhinorrhea. Negative for ear pain and sore throat.    Respiratory: Negative for cough.    Cardiovascular: Negative for chest pain.   Gastrointestinal: Negative for abdominal pain, diarrhea, nausea and vomiting.   Genitourinary: Negative for dysuria.   Musculoskeletal: Positive for back pain.   Allergic/Immunologic: Positive for environmental allergies.   Neurological: Negative for headaches.        Past Medical History:   Past Medical History:   Diagnosis Date   • Allergic rhinitis    • Depression 2018   • Foot pain    • GERD (gastroesophageal reflux disease)    • Hyperlipidemia 2017   • Hypertension    • Testosterone deficiency        Past Surgical History: No past surgical history on file.    Family History:   Family History   Problem Relation Age of Onset   • No Known Problems Mother    • Cancer Father    • Lung cancer Father    • Colon cancer Other        Social History:   Social History     Socioeconomic History   • Marital status: Single   Tobacco Use   • Smoking status: Former Smoker     Packs/day: 0.50     Start date:      Quit date: 2018     Years since quittin.1   •  "Smokeless tobacco: Never Used   • Tobacco comment: quit smoking a month ago quit 2019   Substance and Sexual Activity   • Alcohol use: Yes     Comment: started drinking at 22, 4 cans per month, only drinks on occasions   • Drug use: Never   • Sexual activity: Defer       Medications:     Current Outpatient Medications:   •  Testosterone Cypionate (Depo-Testosterone) 200 MG/ML injection, Inject 200 mg into the appropriate muscle as directed by prescriber Every 28 (Twenty-Eight) Days., Disp: , Rfl:   •  atenolol (TENORMIN) 25 MG tablet, Take 1 tablet by mouth Daily., Disp: 90 tablet, Rfl: 1  •  HYDROcodone-acetaminophen (NORCO) 5-325 MG per tablet, Take 1 tablet by mouth Every 12 (Twelve) Hours As Needed., Disp: , Rfl:   •  lisinopril (PRINIVIL,ZESTRIL) 10 MG tablet, Take 1 tablet by mouth Daily., Disp: 90 tablet, Rfl: 1  •  meloxicam (MOBIC) 15 MG tablet, Take 15 mg by mouth Daily As Needed., Disp: , Rfl:     Allergies:   No Known Allergies        PHQ-2 Total Score: 0   PHQ-9 Total Score: 0     Objective     Physical Exam:  Vital Signs:   Vitals:    02/07/22 1213 02/07/22 1257   BP: 136/91 142/90   Pulse: 60    Temp: 97.4 °F (36.3 °C)    SpO2: 99%    Weight: 95.3 kg (210 lb)    Height: 175.3 cm (69\")      Body mass index is 31.01 kg/m².     Physical Exam  HENT:      Right Ear: Tympanic membrane normal.      Left Ear: Tympanic membrane normal.      Nose: Nose normal.      Mouth/Throat:      Mouth: Mucous membranes are moist.   Eyes:      Conjunctiva/sclera: Conjunctivae normal.      Pupils: Pupils are equal, round, and reactive to light.   Neck:      Vascular: No carotid bruit.   Cardiovascular:      Rate and Rhythm: Normal rate and regular rhythm.      Heart sounds: Normal heart sounds. No murmur heard.      Pulmonary:      Effort: Pulmonary effort is normal.      Breath sounds: Normal breath sounds.   Abdominal:      General: Bowel sounds are normal.      Palpations: Abdomen is soft.   Musculoskeletal:      Right " "lower leg: No edema.      Left lower leg: No edema.   Skin:     General: Skin is warm and dry.   Neurological:      Mental Status: He is alert.   Psychiatric:         Mood and Affect: Mood normal.         Behavior: Behavior normal.             Assessment / Plan      Assessment/Plan:   Diagnoses and all orders for this visit:    1. Mixed hyperlipidemia  -     Comprehensive Metabolic Panel  -     Lipid Panel    2. Hypertension, unspecified type    3. Hypogonadism in male  -     Testosterone, Free, Total  -     PSA DIAGNOSTIC    4. Allergic rhinitis, unspecified seasonality, unspecified trigger  -     CBC Auto Differential    5. Gastroesophageal reflux disease without esophagitis    6. Chronic pain disorder    7. Impaired fasting glucose  -     Hemoglobin A1c    8. Screening for thyroid disorder  -     TSH    9. Obesity (BMI 30-39.9)    Other orders  -     lisinopril (PRINIVIL,ZESTRIL) 10 MG tablet; Take 1 tablet by mouth Daily.  Dispense: 90 tablet; Refill: 1       HTN recheck manually uncontrolled we will add lisinopril 10 mg daily blood pressure check in 2 weeks  Hyperlipidemia we will obtain lipid panel to monitor recommend decrease fried foods red meat pork products and exercise 30 minutes daily with weight loss  Hypogonadism taking testosterone replacement with an online provider we will check testosterone levels and PSA in office today  Seasonal allergies currently controlled with OTC antihistamine as needed  Reflux currently controlled without medication at this time  Chronic back pain currently attending pain management [facility] taking hydrocodone  Impaired fasting glucose obtain hemoglobin A1c to monitor recommend decrease carb intake again exercise 30 minutes daily and weight loss  Will obtain TSH to screen for thyroid disorder        Follow Up:   Return in about 6 months (around 8/7/2022).    FARA Clark    \"Please note that portions of this note were completed with a voice " "recognition program.\"    "

## 2022-02-08 PROBLEM — E66.9 OBESITY (BMI 30-39.9): Status: ACTIVE | Noted: 2022-02-08

## 2022-02-10 LAB
TESTOST FREE SERPL-MCNC: 31.6 PG/ML (ref 6.8–21.5)
TESTOST SERPL-MCNC: 938 NG/DL (ref 264–916)

## 2022-02-11 ENCOUNTER — TELEPHONE (OUTPATIENT)
Dept: FAMILY MEDICINE CLINIC | Facility: CLINIC | Age: 47
End: 2022-02-11

## 2022-02-11 NOTE — TELEPHONE ENCOUNTER
----- Message from FARA Paz sent at 2/10/2022 12:57 PM EST -----  Testosterone level elevated may forward to provider who is currently prescribing testosterone

## 2022-08-05 ENCOUNTER — OFFICE VISIT (OUTPATIENT)
Dept: FAMILY MEDICINE CLINIC | Facility: CLINIC | Age: 47
End: 2022-08-05

## 2022-08-05 VITALS
OXYGEN SATURATION: 98 % | SYSTOLIC BLOOD PRESSURE: 133 MMHG | WEIGHT: 212 LBS | HEART RATE: 68 BPM | TEMPERATURE: 98.1 F | BODY MASS INDEX: 31.4 KG/M2 | HEIGHT: 69 IN | DIASTOLIC BLOOD PRESSURE: 75 MMHG

## 2022-08-05 DIAGNOSIS — Z12.11 SCREENING FOR MALIGNANT NEOPLASM OF COLON: ICD-10-CM

## 2022-08-05 DIAGNOSIS — I10 PRIMARY HYPERTENSION: Primary | ICD-10-CM

## 2022-08-05 DIAGNOSIS — Z80.0 FAMILY HISTORY OF COLON CANCER: ICD-10-CM

## 2022-08-05 DIAGNOSIS — E78.2 MIXED HYPERLIPIDEMIA: ICD-10-CM

## 2022-08-05 DIAGNOSIS — J30.9 ALLERGIC RHINITIS, UNSPECIFIED SEASONALITY, UNSPECIFIED TRIGGER: ICD-10-CM

## 2022-08-05 DIAGNOSIS — R73.01 IMPAIRED FASTING GLUCOSE: ICD-10-CM

## 2022-08-05 DIAGNOSIS — E29.1 HYPOGONADISM IN MALE: ICD-10-CM

## 2022-08-05 DIAGNOSIS — K21.9 GASTROESOPHAGEAL REFLUX DISEASE WITHOUT ESOPHAGITIS: ICD-10-CM

## 2022-08-05 DIAGNOSIS — Z11.59 NEED FOR HEPATITIS C SCREENING TEST: ICD-10-CM

## 2022-08-05 DIAGNOSIS — Z13.29 SCREENING FOR THYROID DISORDER: ICD-10-CM

## 2022-08-05 LAB
ALBUMIN SERPL-MCNC: 4.1 G/DL (ref 3.5–5.2)
ALBUMIN/GLOB SERPL: 1.6 G/DL
ALP SERPL-CCNC: 43 U/L (ref 39–117)
ALT SERPL W P-5'-P-CCNC: 22 U/L (ref 1–41)
ANION GAP SERPL CALCULATED.3IONS-SCNC: 12.7 MMOL/L (ref 5–15)
AST SERPL-CCNC: 18 U/L (ref 1–40)
BASOPHILS # BLD AUTO: 0.08 10*3/MM3 (ref 0–0.2)
BASOPHILS NFR BLD AUTO: 0.9 % (ref 0–1.5)
BILIRUB SERPL-MCNC: 0.4 MG/DL (ref 0–1.2)
BUN SERPL-MCNC: 10 MG/DL (ref 6–20)
BUN/CREAT SERPL: 9.3 (ref 7–25)
CALCIUM SPEC-SCNC: 9.4 MG/DL (ref 8.6–10.5)
CHLORIDE SERPL-SCNC: 104 MMOL/L (ref 98–107)
CHOLEST SERPL-MCNC: 163 MG/DL (ref 0–200)
CO2 SERPL-SCNC: 26.3 MMOL/L (ref 22–29)
CREAT SERPL-MCNC: 1.08 MG/DL (ref 0.76–1.27)
DEPRECATED RDW RBC AUTO: 41.1 FL (ref 37–54)
EGFRCR SERPLBLD CKD-EPI 2021: 86.2 ML/MIN/1.73
EOSINOPHIL # BLD AUTO: 0.2 10*3/MM3 (ref 0–0.4)
EOSINOPHIL NFR BLD AUTO: 2.2 % (ref 0.3–6.2)
ERYTHROCYTE [DISTWIDTH] IN BLOOD BY AUTOMATED COUNT: 12.9 % (ref 12.3–15.4)
GLOBULIN UR ELPH-MCNC: 2.5 GM/DL
GLUCOSE SERPL-MCNC: 79 MG/DL (ref 65–99)
HBA1C MFR BLD: 5.3 % (ref 4.8–5.6)
HCT VFR BLD AUTO: 43.5 % (ref 37.5–51)
HCV AB SER DONR QL: NORMAL
HDLC SERPL-MCNC: 29 MG/DL (ref 40–60)
HGB BLD-MCNC: 14 G/DL (ref 13–17.7)
IMM GRANULOCYTES # BLD AUTO: 0.02 10*3/MM3 (ref 0–0.05)
IMM GRANULOCYTES NFR BLD AUTO: 0.2 % (ref 0–0.5)
LDLC SERPL CALC-MCNC: 98 MG/DL (ref 0–100)
LDLC/HDLC SERPL: 3.17 {RATIO}
LYMPHOCYTES # BLD AUTO: 2.58 10*3/MM3 (ref 0.7–3.1)
LYMPHOCYTES NFR BLD AUTO: 28.3 % (ref 19.6–45.3)
MCH RBC QN AUTO: 28.3 PG (ref 26.6–33)
MCHC RBC AUTO-ENTMCNC: 32.2 G/DL (ref 31.5–35.7)
MCV RBC AUTO: 88.1 FL (ref 79–97)
MONOCYTES # BLD AUTO: 1.04 10*3/MM3 (ref 0.1–0.9)
MONOCYTES NFR BLD AUTO: 11.4 % (ref 5–12)
NEUTROPHILS NFR BLD AUTO: 5.21 10*3/MM3 (ref 1.7–7)
NEUTROPHILS NFR BLD AUTO: 57 % (ref 42.7–76)
NRBC BLD AUTO-RTO: 0 /100 WBC (ref 0–0.2)
PLATELET # BLD AUTO: 311 10*3/MM3 (ref 140–450)
PMV BLD AUTO: 9.8 FL (ref 6–12)
POTASSIUM SERPL-SCNC: 4.2 MMOL/L (ref 3.5–5.2)
PROT SERPL-MCNC: 6.6 G/DL (ref 6–8.5)
RBC # BLD AUTO: 4.94 10*6/MM3 (ref 4.14–5.8)
SODIUM SERPL-SCNC: 143 MMOL/L (ref 136–145)
TRIGL SERPL-MCNC: 211 MG/DL (ref 0–150)
TSH SERPL DL<=0.05 MIU/L-ACNC: 1.44 UIU/ML (ref 0.27–4.2)
VLDLC SERPL-MCNC: 36 MG/DL (ref 5–40)
WBC NRBC COR # BLD: 9.13 10*3/MM3 (ref 3.4–10.8)

## 2022-08-05 PROCEDURE — 83036 HEMOGLOBIN GLYCOSYLATED A1C: CPT | Performed by: NURSE PRACTITIONER

## 2022-08-05 PROCEDURE — 99214 OFFICE O/P EST MOD 30 MIN: CPT | Performed by: NURSE PRACTITIONER

## 2022-08-05 PROCEDURE — 86803 HEPATITIS C AB TEST: CPT | Performed by: NURSE PRACTITIONER

## 2022-08-05 PROCEDURE — 80061 LIPID PANEL: CPT | Performed by: NURSE PRACTITIONER

## 2022-08-05 PROCEDURE — 84403 ASSAY OF TOTAL TESTOSTERONE: CPT | Performed by: NURSE PRACTITIONER

## 2022-08-05 PROCEDURE — 84402 ASSAY OF FREE TESTOSTERONE: CPT | Performed by: NURSE PRACTITIONER

## 2022-08-05 PROCEDURE — 80053 COMPREHEN METABOLIC PANEL: CPT | Performed by: NURSE PRACTITIONER

## 2022-08-05 PROCEDURE — 84443 ASSAY THYROID STIM HORMONE: CPT | Performed by: NURSE PRACTITIONER

## 2022-08-05 PROCEDURE — 85025 COMPLETE CBC W/AUTO DIFF WBC: CPT | Performed by: NURSE PRACTITIONER

## 2022-08-05 RX ORDER — LISINOPRIL 10 MG/1
10 TABLET ORAL DAILY
Qty: 90 TABLET | Refills: 1 | Status: SHIPPED | OUTPATIENT
Start: 2022-08-05 | End: 2022-11-01

## 2022-08-05 RX ORDER — MELOXICAM 15 MG/1
TABLET ORAL
COMMUNITY
End: 2022-08-05

## 2022-08-05 RX ORDER — ATENOLOL 25 MG/1
25 TABLET ORAL DAILY
Qty: 90 TABLET | Refills: 1 | Status: SHIPPED | OUTPATIENT
Start: 2022-08-05 | End: 2022-12-21 | Stop reason: SDUPTHER

## 2022-08-05 NOTE — PROGRESS NOTES
Follow Up Office Visit      Patient Name: Avtar Chapman  : 1975   MRN: [id number]     Chief Complaint:    Chief Complaint   Patient presents with   • Hypertension   • Hyperlipidemia   • Allergic Rhinitis       History of Present Illness: Avtar Chapman is a 46 y.o. male who is here today to follow up for HTN, hyperlipidemia, allergic rhinitis     Pt reports receiving testosterone injection 1x per week with an online provider from [place]           Subjective      Review of Systems:   Review of Systems   Constitutional: Negative for chills and fever.   HENT: Negative for ear pain, sinus pain and sore throat.    Respiratory: Negative for shortness of breath.    Cardiovascular: Negative for chest pain.   Gastrointestinal: Negative for diarrhea, nausea and vomiting.   Genitourinary: Negative for dysuria.   Musculoskeletal: Positive for back pain.   Neurological: Negative for headaches.        Past Medical History:   Past Medical History:   Diagnosis Date   • Allergic rhinitis    • Depression 2018   • Foot pain    • GERD (gastroesophageal reflux disease)    • Hyperlipidemia 2017   • Hypertension    • Testosterone deficiency        Past Surgical History: No past surgical history on file.    Family History:   Family History   Problem Relation Age of Onset   • No Known Problems Mother    • Cancer Father    • Lung cancer Father    • Colon cancer Other        Social History:   Social History     Socioeconomic History   • Marital status: Single   Tobacco Use   • Smoking status: Former Smoker     Packs/day: 0.50     Start date:      Quit date: 2018     Years since quittin.5   • Smokeless tobacco: Never Used   • Tobacco comment: quit smoking a month ago quit 2019   Substance and Sexual Activity   • Alcohol use: Yes     Comment: started drinking at 22, 4 cans per month, only drinks on occasions   • Drug use: Never   • Sexual activity: Defer       Medications:     Current Outpatient Medications:  "  •  atenolol (TENORMIN) 25 MG tablet, Take 1 tablet by mouth Daily., Disp: 90 tablet, Rfl: 1  •  HYDROcodone-acetaminophen (NORCO) 5-325 MG per tablet, Take 1 tablet by mouth Every 12 (Twelve) Hours As Needed., Disp: , Rfl:   •  lisinopril (PRINIVIL,ZESTRIL) 10 MG tablet, Take 1 tablet by mouth Daily., Disp: 90 tablet, Rfl: 1  •  meloxicam (MOBIC) 15 MG tablet, Take 15 mg by mouth Daily As Needed., Disp: , Rfl:   •  Testosterone Cypionate (DEPOTESTOTERONE CYPIONATE) 200 MG/ML injection, Inject 200 mg into the appropriate muscle as directed by prescriber Every 28 (Twenty-Eight) Days., Disp: , Rfl:     Allergies:   No Known Allergies           Objective     Physical Exam:  Vital Signs:   Vitals:    08/05/22 1141   BP: 133/75   Pulse: 68   Temp: 98.1 °F (36.7 °C)   SpO2: 98%   Weight: 96.2 kg (212 lb)   Height: 175.3 cm (69\")     Body mass index is 31.31 kg/m².     Physical Exam  HENT:      Right Ear: Tympanic membrane normal.      Left Ear: Tympanic membrane normal.      Nose: Nose normal.      Mouth/Throat:      Mouth: Mucous membranes are moist.   Eyes:      Pupils: Pupils are equal, round, and reactive to light.   Neck:      Vascular: No carotid bruit.   Cardiovascular:      Rate and Rhythm: Normal rate and regular rhythm.      Heart sounds: Normal heart sounds. No murmur heard.  Pulmonary:      Effort: Pulmonary effort is normal.      Breath sounds: Normal breath sounds.   Abdominal:      General: Bowel sounds are normal.      Palpations: Abdomen is soft.   Musculoskeletal:      Right lower leg: No edema.      Left lower leg: No edema.   Skin:     General: Skin is warm and dry.   Neurological:      Mental Status: He is alert.   Psychiatric:         Mood and Affect: Mood normal.         Behavior: Behavior normal.             Assessment / Plan      Assessment/Plan:   Diagnoses and all orders for this visit:    1. Primary hypertension (Primary)    2. Mixed hyperlipidemia  -     Lipid Panel    3. Allergic rhinitis, " "unspecified seasonality, unspecified trigger  -     CBC Auto Differential    4. Gastroesophageal reflux disease without esophagitis    5. Impaired fasting glucose  -     Comprehensive Metabolic Panel  -     Hemoglobin A1c    6. Hypogonadism in male  -     Testosterone, Free, Total    7. Screening for thyroid disorder  -     TSH    8. Need for hepatitis C screening test  -     Hepatitis C Antibody    9. Family history of colon cancer  -     Ambulatory Referral For Screening Colonoscopy    10. Screening for malignant neoplasm of colon  -     Ambulatory Referral For Screening Colonoscopy    Other orders  -     lisinopril (PRINIVIL,ZESTRIL) 10 MG tablet; Take 1 tablet by mouth Daily.  Dispense: 90 tablet; Refill: 1  -     atenolol (TENORMIN) 25 MG tablet; Take 1 tablet by mouth Daily.  Dispense: 90 tablet; Refill: 1       HTN currently controlled lisinopril 10 mg atenolol 25  Hyperlipidemia we will obtain lipid panel to monitor recommend decrease fried foods red meat pork products cheese exercise 30 minutes daily weight loss  gerd currently controlled without medication at this time  Allergic rhinitis controlled with as needed use of antihistamine  Impaired fasting glucose will obtain hemoglobin A1c to monitor recommend decrease carb intake and exercise 30 minutes daily  Hypogonadism in male we will obtain testosterone to monitor forward with his provider that is prescribing testosterone          Follow Up:   Return in about 6 months (around 2/5/2023).    Reinaldo Lezama, FAAR    \"Please note that portions of this note were completed with a voice recognition program.\"    "

## 2022-08-11 ENCOUNTER — TELEPHONE (OUTPATIENT)
Dept: FAMILY MEDICINE CLINIC | Facility: CLINIC | Age: 47
End: 2022-08-11

## 2022-08-11 LAB
TESTOST FREE SERPL-MCNC: 48.8 PG/ML (ref 6.8–21.5)
TESTOST SERPL-MCNC: >1500 NG/DL (ref 264–916)

## 2022-08-11 NOTE — TELEPHONE ENCOUNTER
----- Message from FARA Paz sent at 8/11/2022  3:03 PM EDT -----  Testosterone extremely high please forward to provider that is prescribing his testosterone

## 2022-08-16 ENCOUNTER — OFFICE VISIT (OUTPATIENT)
Dept: SURGERY | Facility: CLINIC | Age: 47
End: 2022-08-16

## 2022-08-16 ENCOUNTER — PREP FOR SURGERY (OUTPATIENT)
Dept: OTHER | Facility: HOSPITAL | Age: 47
End: 2022-08-16

## 2022-08-16 VITALS — BODY MASS INDEX: 31.7 KG/M2 | WEIGHT: 214 LBS | HEART RATE: 74 BPM | HEIGHT: 69 IN

## 2022-08-16 DIAGNOSIS — Z80.0 FAMILY HISTORY OF COLON CANCER: ICD-10-CM

## 2022-08-16 DIAGNOSIS — Z12.11 SCREENING FOR MALIGNANT NEOPLASM OF COLON: Primary | ICD-10-CM

## 2022-08-16 PROCEDURE — S0260 H&P FOR SURGERY: HCPCS | Performed by: NURSE PRACTITIONER

## 2022-08-16 RX ORDER — POLYETHYLENE GLYCOL 3350 17 G/17G
POWDER, FOR SOLUTION ORAL
Qty: 238 PACKET | Refills: 0 | Status: ON HOLD | OUTPATIENT
Start: 2022-08-16 | End: 2022-09-27

## 2022-08-16 NOTE — PROGRESS NOTES
Chief Complaint: Colonoscopy (consult)    Subjective      Colonoscopy consultation       History of Present Illness  Avtar Chapman is a 46 y.o. male presents to [facility] for colonoscopy consultation.    Patient presents today on referral from andrei Virgen for colonoscopy consultation.  Patient denies any abdominal pain, change in bowel habit, rectal bleeding.    Admits to family history of colon cancer with paternal grandfather.    No Previous colonoscopy.    Objective     Past Medical History:   Diagnosis Date   • Allergic rhinitis    • Depression 2018   • Foot pain    • GERD (gastroesophageal reflux disease)    • Hyperlipidemia 2017   • Hypertension    • Testosterone deficiency        History reviewed. No pertinent surgical history.    Outpatient Medications Marked as Taking for the 22 encounter (Office Visit) with Dara Olvera APRN   Medication Sig Dispense Refill   • atenolol (TENORMIN) 25 MG tablet Take 1 tablet by mouth Daily. 90 tablet 1   • HYDROcodone-acetaminophen (NORCO) 5-325 MG per tablet Take 1 tablet by mouth Every 12 (Twelve) Hours As Needed.     • lisinopril (PRINIVIL,ZESTRIL) 10 MG tablet Take 1 tablet by mouth Daily. 90 tablet 1   • meloxicam (MOBIC) 15 MG tablet Take 15 mg by mouth Daily As Needed.     • Testosterone Cypionate (DEPOTESTOTERONE CYPIONATE) 200 MG/ML injection Inject 200 mg into the appropriate muscle as directed by prescriber Every 28 (Twenty-Eight) Days.         No Known Allergies     Family History   Problem Relation Age of Onset   • No Known Problems Mother    • Cancer Father    • Lung cancer Father    • Colon cancer Other        Social History     Socioeconomic History   • Marital status: Single   Tobacco Use   • Smoking status: Former Smoker     Packs/day: 0.50     Start date:      Quit date: 2018     Years since quittin.6   • Smokeless tobacco: Never Used   • Tobacco comment: quit smoking a month  "ago quit 2019   Vaping Use   • Vaping Use: Some days   • Substances: Nicotine, Flavoring   • Devices: Disposable   Substance and Sexual Activity   • Alcohol use: Yes     Comment: started drinking at 22, 4 cans per month, only drinks on occasions   • Drug use: Never   • Sexual activity: Defer       Review of Systems   Constitutional: Negative for chills and fever.   Gastrointestinal: Negative for abdominal distention, abdominal pain, anal bleeding, blood in stool, constipation, diarrhea and rectal pain.        Vital Signs:   Pulse 74   Ht 175.3 cm (69\")   Wt 97.1 kg (214 lb)   BMI 31.60 kg/m²      Physical Exam  Vitals and nursing note reviewed.   Constitutional:       General: He is not in acute distress.     Appearance: Normal appearance.   HENT:      Head: Normocephalic.   Pulmonary:      Effort: Pulmonary effort is normal.      Breath sounds: No stridor. No wheezing.   Abdominal:      General: Abdomen is flat.      Palpations: Abdomen is soft.      Tenderness: There is no guarding.   Musculoskeletal:         General: No deformity. Normal range of motion.   Skin:     General: Skin is warm and dry.      Coloration: Skin is not jaundiced.   Neurological:      General: No focal deficit present.      Mental Status: He is alert and oriented to person, place, and time.   Psychiatric:         Mood and Affect: Mood normal.         Thought Content: Thought content normal.          Result Review :          []  Laboratory  []  Radiology  []  Pathology  []  Microbiology  []  EKG/Telemetry   []  Cardiology/Vascular   [x]  Old records  Today I have reviewed andrei Lezama's previous office note.     Assessment and Plan    Diagnoses and all orders for this visit:    1. Screening for malignant neoplasm of colon (Primary)    2. Family history of colon cancer    Other orders  -     polyethylene glycol (MIRALAX) 17 g packet; Take as directed.  Instructions given in office.  Dispense: 238 g bottle  Dispense: 238 packet; " Refill: 0    white prep    Follow Up   Return for Schedule colonoscopy with Dr. Augustin on 9/27/2022 at [facility].     Hospital arrival time: 1330.    Possible risks/complications, benefits, and alternatives to surgical or invasive procedure have been explained to patient and/or legal guardian.     Patient has been evaluated and can tolerate anesthesia and/or sedation. Risks, benefits, and alternatives to anesthesia and sedation have been explained to patient and/or legal guardian.  Patient verbalizes understanding and is will proceed with above plan.    Patient was given instructions and counseling regarding his condition or for health maintenance advice. Please see specific information pulled into the AVS if appropriate.

## 2022-08-24 DIAGNOSIS — L73.9 FOLLICULITIS: Primary | ICD-10-CM

## 2022-09-27 ENCOUNTER — ANESTHESIA EVENT (OUTPATIENT)
Dept: GASTROENTEROLOGY | Facility: HOSPITAL | Age: 47
End: 2022-09-27

## 2022-09-27 ENCOUNTER — HOSPITAL ENCOUNTER (OUTPATIENT)
Facility: HOSPITAL | Age: 47
Setting detail: HOSPITAL OUTPATIENT SURGERY
Discharge: HOME | End: 2022-09-27
Attending: SURGERY | Admitting: SURGERY

## 2022-09-27 ENCOUNTER — ANESTHESIA (OUTPATIENT)
Dept: GASTROENTEROLOGY | Facility: HOSPITAL | Age: 47
End: 2022-09-27

## 2022-09-27 VITALS
HEART RATE: 69 BPM | SYSTOLIC BLOOD PRESSURE: 104 MMHG | BODY MASS INDEX: 30.63 KG/M2 | RESPIRATION RATE: 20 BRPM | WEIGHT: 206.79 LBS | OXYGEN SATURATION: 100 % | TEMPERATURE: 98.3 F | HEIGHT: 69 IN | DIASTOLIC BLOOD PRESSURE: 68 MMHG

## 2022-09-27 PROBLEM — Z12.11 SCREENING FOR MALIGNANT NEOPLASM OF COLON: Status: RESOLVED | Noted: 2022-08-16 | Resolved: 2022-09-27

## 2022-09-27 PROBLEM — Z80.0 FAMILY HISTORY OF COLON CANCER: Status: RESOLVED | Noted: 2022-08-16 | Resolved: 2022-09-27

## 2022-09-27 PROCEDURE — 25010000002 PROPOFOL 10 MG/ML EMULSION: Performed by: NURSE ANESTHETIST, CERTIFIED REGISTERED

## 2022-09-27 RX ORDER — GLYCOPYRROLATE 0.2 MG/ML
INJECTION INTRAMUSCULAR; INTRAVENOUS AS NEEDED
Status: DISCONTINUED | OUTPATIENT
Start: 2022-09-27 | End: 2022-09-27 | Stop reason: SURG

## 2022-09-27 RX ORDER — LIDOCAINE HYDROCHLORIDE 20 MG/ML
INJECTION, SOLUTION EPIDURAL; INFILTRATION; INTRACAUDAL; PERINEURAL AS NEEDED
Status: DISCONTINUED | OUTPATIENT
Start: 2022-09-27 | End: 2022-09-27 | Stop reason: SURG

## 2022-09-27 RX ORDER — SODIUM CHLORIDE, SODIUM LACTATE, POTASSIUM CHLORIDE, CALCIUM CHLORIDE 600; 310; 30; 20 MG/100ML; MG/100ML; MG/100ML; MG/100ML
30 INJECTION, SOLUTION INTRAVENOUS CONTINUOUS
Status: DISCONTINUED | OUTPATIENT
Start: 2022-09-27 | End: 2022-09-27 | Stop reason: HOSPADM

## 2022-09-27 RX ORDER — PHENYLEPHRINE HCL IN 0.9% NACL 1 MG/10 ML
SYRINGE (ML) INTRAVENOUS AS NEEDED
Status: DISCONTINUED | OUTPATIENT
Start: 2022-09-27 | End: 2022-09-27 | Stop reason: SURG

## 2022-09-27 RX ORDER — PROPOFOL 10 MG/ML
VIAL (ML) INTRAVENOUS AS NEEDED
Status: DISCONTINUED | OUTPATIENT
Start: 2022-09-27 | End: 2022-09-27 | Stop reason: SURG

## 2022-09-27 RX ADMIN — PROPOFOL 150 MG: 10 INJECTION, EMULSION INTRAVENOUS at 14:50

## 2022-09-27 RX ADMIN — LIDOCAINE HYDROCHLORIDE 100 MG: 20 INJECTION, SOLUTION EPIDURAL; INFILTRATION; INTRACAUDAL; PERINEURAL at 14:50

## 2022-09-27 RX ADMIN — SODIUM CHLORIDE, POTASSIUM CHLORIDE, SODIUM LACTATE AND CALCIUM CHLORIDE 30 ML/HR: 600; 310; 30; 20 INJECTION, SOLUTION INTRAVENOUS at 14:08

## 2022-09-27 RX ADMIN — PROPOFOL 160 MCG/KG/MIN: 10 INJECTION, EMULSION INTRAVENOUS at 14:50

## 2022-09-27 RX ADMIN — GLYCOPYRROLATE 0.2 MG: 0.2 INJECTION INTRAMUSCULAR; INTRAVENOUS at 15:07

## 2022-09-27 RX ADMIN — Medication 50 MCG: at 15:08

## 2022-09-27 NOTE — H&P
Chief Complaint: Colonoscopy (consult)    Subjective      Colonoscopy consultation       History of Present Illness  Avtar Chapman is a 46 y.o. male presents to [facility] for colonoscopy consultation.    Patient presents today on referral from andrei Virgen for colonoscopy consultation.  Patient denies any abdominal pain, change in bowel habit, rectal bleeding.    Admits to family history of colon cancer with paternal grandfather.    No Previous colonoscopy.    Objective     Past Medical History:   Diagnosis Date   • Allergic rhinitis    • Depression 2018   • Foot pain    • GERD (gastroesophageal reflux disease)    • Hyperlipidemia 2017   • Hypertension    • Testosterone deficiency        History reviewed. No pertinent surgical history.    Outpatient Medications Marked as Taking for the 22 encounter (Office Visit) with Dara Olvera APRN   Medication Sig Dispense Refill   • atenolol (TENORMIN) 25 MG tablet Take 1 tablet by mouth Daily. 90 tablet 1   • HYDROcodone-acetaminophen (NORCO) 5-325 MG per tablet Take 1 tablet by mouth Every 12 (Twelve) Hours As Needed.     • lisinopril (PRINIVIL,ZESTRIL) 10 MG tablet Take 1 tablet by mouth Daily. 90 tablet 1   • meloxicam (MOBIC) 15 MG tablet Take 15 mg by mouth Daily As Needed.     • Testosterone Cypionate (DEPOTESTOTERONE CYPIONATE) 200 MG/ML injection Inject 200 mg into the appropriate muscle as directed by prescriber Every 28 (Twenty-Eight) Days.         No Known Allergies     Family History   Problem Relation Age of Onset   • No Known Problems Mother    • Cancer Father    • Lung cancer Father    • Colon cancer Other        Social History     Socioeconomic History   • Marital status: Single   Tobacco Use   • Smoking status: Former Smoker     Packs/day: 0.50     Start date:      Quit date: 2018     Years since quittin.6   • Smokeless tobacco: Never Used   • Tobacco comment: quit smoking a month  "ago quit 2019   Vaping Use   • Vaping Use: Some days   • Substances: Nicotine, Flavoring   • Devices: Disposable   Substance and Sexual Activity   • Alcohol use: Yes     Comment: started drinking at 22, 4 cans per month, only drinks on occasions   • Drug use: Never   • Sexual activity: Defer       Review of Systems   Constitutional: Negative for chills and fever.   Gastrointestinal: Negative for abdominal distention, abdominal pain, anal bleeding, blood in stool, constipation, diarrhea and rectal pain.        Vital Signs:   Pulse 74   Ht 175.3 cm (69\")   Wt 97.1 kg (214 lb)   BMI 31.60 kg/m²      Physical Exam  Vitals and nursing note reviewed.   Constitutional:       General: He is not in acute distress.     Appearance: Normal appearance.   HENT:      Head: Normocephalic.   Pulmonary:      Effort: Pulmonary effort is normal.      Breath sounds: No stridor. No wheezing.   Musculoskeletal:         General: No deformity. Normal range of motion.   Skin:     General: Skin is warm and dry.      Coloration: Skin is not jaundiced.   Neurological:      General: No focal deficit present.      Mental Status: He is alert and oriented to person, place, and time.     Assessment   Screening colonoscopy    Plan  Colonoscopy    Risks and benefits discussed    Lai Augustin M.D.  09/27/22    Electronically signed by Lai Augustin MD, 09/27/22, 9:54 AM EDT.  "

## 2022-09-27 NOTE — ANESTHESIA POSTPROCEDURE EVALUATION
Patient: Avtar Chapman    Procedure Summary     Date: 09/27/22 Room / Location: [facility] ENDOSCOPY 2 / [facility] ENDOSCOPY    Anesthesia Start: 1447 Anesthesia Stop: 1516    Procedure: COLONOSCOPY (N/A ) Diagnosis:       Screening for malignant neoplasm of colon      Family history of colon cancer      (Screening for malignant neoplasm of colon [Z12.11])      (Family history of colon cancer [Z80.0])    Surgeons: Lai Augustin MD Provider: Vish Pérez MD    Anesthesia Type: general ASA Status: 2          Anesthesia Type: general    Vitals  Vitals Value Taken Time   /68 09/27/22 1535   Temp 36.8 °C (98.3 °F) 09/27/22 1515   Pulse 65 09/27/22 1535   Resp 20 09/27/22 1534   SpO2 100 % 09/27/22 1535   Vitals shown include unvalidated device data.        Post Anesthesia Care and Evaluation    Patient location during evaluation: bedside  Patient participation: complete - patient participated  Level of consciousness: awake  Pain management: adequate    Airway patency: patent  Anesthetic complications: No anesthetic complications  PONV Status: none  Cardiovascular status: acceptable and stable  Respiratory status: acceptable and room air  Hydration status: acceptable    Comments: An Anesthesiologist personally participated in the most demanding procedures (including induction and emergence if applicable) in the anesthesia plan, monitored the course of anesthesia administration at frequent intervals and remained physically present and available for immediate diagnosis and treatment of emergencies.

## 2022-09-27 NOTE — ANESTHESIA PREPROCEDURE EVALUATION
Anesthesia Evaluation     Patient summary reviewed and Nursing notes reviewed   no history of anesthetic complications:  NPO Solid Status: > 8 hours  NPO Liquid Status: > 2 hours           Airway   Mallampati: II  TM distance: >3 FB  Neck ROM: full  No difficulty expected  Dental      Pulmonary - negative pulmonary ROS and normal exam    breath sounds clear to auscultation  Cardiovascular - normal exam  Exercise tolerance: good (4-7 METS)    Rhythm: regular  Rate: normal    (+) hypertension well controlled, hyperlipidemia,       Neuro/Psych- negative ROS  GI/Hepatic/Renal/Endo    (+) obesity,  GERD well controlled,      Musculoskeletal     Abdominal    Substance History - negative use     OB/GYN negative ob/gyn ROS         Other   arthritis,      ROS/Med Hx Other: PAT Nursing Notes unavailable.                   Anesthesia Plan    ASA 2     general     (Total IV Anesthesia    Patient understands anesthesia not responsible for dental damage.  )  intravenous induction     Anesthetic plan, risks, benefits, and alternatives have been provided, discussed and informed consent has been obtained with: patient.    Plan discussed with CRNA.        CODE STATUS:

## 2022-09-27 NOTE — DISCHARGE INSTRUCTIONS
You may resume normal activity on 9/28/22 at 3:15.  Stay away from greasy, gas producing, and spicy foods today.  Start off with bland foods.  Be near a bathroom when you eat, the bowel prep might still be working it's way through your body.  It is normal to find a little blood on your toilet paper.  IF YOU ARE PASSING BLOOD CLOTS OR FILLING THE TOILET, CALL 911 AND/OR GO TO YOUR NEAREST ER.    NO DRIVING, SIGNING LEGAL DOCUMENTS, OR ALCOHOL CONSUMPTION FOR 24 HOURS.

## 2022-11-01 RX ORDER — LISINOPRIL 10 MG/1
10 TABLET ORAL DAILY
Qty: 90 TABLET | Refills: 1 | Status: SHIPPED | OUTPATIENT
Start: 2022-11-01 | End: 2023-01-20 | Stop reason: SDUPTHER

## 2022-11-15 NOTE — H&P
History and Physical      Patient Name: Avtar Chapman   Patient ID: [id number]   Sex: Male   YOB: 1975    Primary Care Provider: Estevan CHAUDHARI   Referring Provider: Estevan CHAUDHARI    Visit Date: May 7, 2021    Provider: Kevin Paul DPM   Location: [facility]   Location Address: [address] [place] [address]  [id number]   Location Phone: [phone]          Chief Complaint  · Right Foot Pain      History Of Present Illness  Avtar Chapman is a 45 year old /White male who presents to the [facility] today new patient referred from Estevan CHAUDHARI.      New, Established, New Problem:  new  Location:  Right heel  Duration:  early May 2021, but states he has some symptoms in March 2021.  There were ruled out at DVT.  Onset:  gradual  Nature:  achy, sharp, shooting  Stable, worsening, improving:  worsening  Aggravating factors:    Patient describes morning Right heel pain as stabbing, burning, or aching. This pain usually subsides throughout the day, however it returns afterperiods of rest andsitting, when standing back up on their feet,and again the next morning.    Previous Treatment:  x-ray    Patient denies any fevers, chills, nausea, vomiting, shortness of breathe, nor any other constitutional signs nor symptoms.    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       Past Medical History  Allergic rhinitis; Depression; Foot pain; GERD; Hyperlipidemia; Hypertension         Past Surgical History  *No Past Surgical History         Medication List  atenolol 25 mg oral tablet; hydrocodone-acetaminophen 5-325 mg oral tablet         Allergy List  NO KNOWN DRUG ALLERGIES       Allergies Reconciled  Family Medical History  Lung Neoplasm, Malignant; Colon Cancer         Social History  Alcohol (Current some day); Tobacco (Former)         Immunizations  Name Date Admin   Influenza 10/20/2014   Tdap 10/20/2014  History  Chief Complaint   Patient presents with   • Cough     62 yof with cough  Sob  Wheezing  Onset 3 days ago  Worsening  wose at night  Cough keeping her up  Minimal sputum production  No exertional component  No fevers  No cp  No le edema  No pleurisy  Previous smoker  DM  Hx breast ca, remission  On exam wheezing through lungs but no distress  Tachy but otherwise noraml vitals    Likely bronchitis, plan cxr, treat with albuterol, steroids, azithromycin      Cough  Associated symptoms: no chest pain, no chills, no fever, no headaches, no rash and no shortness of breath        Prior to Admission Medications   Prescriptions Last Dose Informant Patient Reported? Taking? Blood Glucose Monitoring Suppl (Contour Next EZ) w/Device KIT  Self Yes No   Continuous Blood Gluc  (Dexcom G6 ) LENA  Self No No   Sig: Use 1 Units in the morning   Continuous Blood Gluc Sensor (Dexcom G6 Sensor) MISC  Self No No   Sig: Use 1 Units in the morning   Continuous Blood Gluc Transmit (Dexcom G6 Transmitter) MISC   No No   Sig: As directed   Contour Next Test test strip  Self Yes No   Farxiga 10 MG TABS  Self No No   Sig: Take 1 tablet (10 mg total) by mouth daily   Microlet Lancets MISC  Self Yes No   atorvastatin (LIPITOR) 10 mg tablet  Self Yes No   Sig: Take 10 mg by mouth daily    clotrimazole-betamethasone (LOTRISONE) 1-0 05 % cream  Self No No   Sig: Apply topically 2 (two) times a day   insulin glargine (Semglee) 100 units/mL subcutaneous injection  Self No No   Sig: Inject 10 Units under the skin daily at bedtime   lisinopril (ZESTRIL) 10 mg tablet  Self No No   Sig: Take 1 tablet (10 mg total) by mouth in the morning     metFORMIN (GLUCOPHAGE) 500 mg tablet  Self Yes No   Sig: Take 1,000 mg by mouth 2 (two) times a day with meals    venlafaxine (EFFEXOR-XR) 150 mg 24 hr capsule  Self No No   Sig: Take 1 capsule (150 mg total) by mouth daily   venlafaxine (EFFEXOR-XR) 75 mg 24 hr capsule  Self No No   Sig: "        Review of Systems  · Constitutional  o Denies  o : fatigue, night sweats  · Eyes  o Denies  o : double vision, blurred vision  · HENT  o Denies  o : vertigo, recent head injury  · Cardiovascular  o Denies  o : chest pain, irregular heart beats  · Respiratory  o Denies  o : shortness of breath, productive cough  · Gastrointestinal  o Denies  o : nausea, vomiting  · Genitourinary  o Denies  o : dysuria, urinary retention  · Integument  o Denies  o : hair growth change, new skin lesions  · Neurologic  o Denies  o : altered mental status, seizures  · Musculoskeletal  o * See HPI  · Endocrine  o Denies  o : cold intolerance, heat intolerance  · Heme-Lymph  o Denies  o : petechiae, lymph node enlargement or tenderness  · Allergic-Immunologic  o Denies  o : frequent illnesses      Vitals  Date Time BP Position Site L\R Cuff Size HR RR TEMP (F) WT  HT  BMI kg/m2 BSA m2 O2 Sat FR L/min FiO2 HC       05/07/2021 09:16 /81 Sitting    72 - R  97.6 222lbs 0oz 5'  9\" 32.78 2.21 99 %            Physical Examination  · Constitutional  o Appearance  o : Awake, alert, well developed, well nourished and well groomed  · Cardiovascular  o Peripheral Vascular System  o :   § Pedal Pulses  § : Pedal Pulses are +2 and symmetrical   § Extremities  § : No edema of the lower extremities  · Skin and Subcutaneous Tissue  o General Inspection  o : Skin is noted to have normal texture and turgor, with no excresences noted.  o Digits and Nails  o : The tonails are noted to be without disease.  · Neurologic  o Sensation  o : Epicritic sensations intact bilaterally.  · Right Ankle/Foot  o Inspection  o : Positive tenderness to palpaiton to the medial tubercle of the calcaneus. No signs of edema, erythema, lymphangitis, nor signs of infection.      Dr. Paul reviewed radiographs and results from [facility] and discussed them with the patient.  These are significant for no periosteal reactions nor osteolytic changes seen.  " Take 1 capsule (75 mg total) by mouth daily      Facility-Administered Medications: None       Past Medical History:   Diagnosis Date   • Anxiety    • Breast cancer ([facility] [address] ) 2008    right  bilateral mastectomy  Had chemo   • Diabetes mellitus ([facility] [address] )     dx    • IBS (irritable bowel syndrome)    • Major depressive disorder with single episode, in full remission [facility])        Past Surgical History:   Procedure Laterality Date   • COMPLETE MASTECTOMY W/ SENTINEL NODE BIOPSY Bilateral 2008    expanders placed   • INCISION AND DRAINAGE (I&D) CHEST WALL      breast infection post chemo   • IR PORT REMOVAL AND PLACEMENT NEW SITE         Family History   Problem Relation Age of Onset   • No Known Problems Mother    • Alzheimer's disease Father    • Heart disease Maternal Grandmother    • Alzheimer's disease Paternal Grandmother    • Fibromyalgia Daughter    • Migraines Daughter    • Anemia Daughter    • No Known Problems Son      I have reviewed and agree with the history as documented  E-Cigarette/Vaping   • E-Cigarette Use Never User      E-Cigarette/Vaping Substances   • Nicotine No    • THC No    • CBD No    • Flavoring No    • Other No      Social History     Tobacco Use   • Smoking status: Former Smoker     Packs/day: 0 50     Years: 20 00     Pack years: 10 00     Types: Cigarettes     Quit date:      Years since quittin 8   • Smokeless tobacco: Never Used   • Tobacco comment: stopped 16 years ago   Vaping Use   • Vaping Use: Never used   Substance Use Topics   • Alcohol use: Yes     Comment: 2 drinks per month   • Drug use: Never       Review of Systems   Constitutional: Negative for chills, fatigue and fever  Eyes: Negative for photophobia and visual disturbance  Respiratory: Positive for cough  Negative for shortness of breath  Cardiovascular: Negative for chest pain, palpitations and leg swelling  Gastrointestinal: Negative for diarrhea, nausea and vomiting     Endocrine: Negative for No occult fractures seen.           Assessment  · Foot pain, right     729.5/M79.671  · Plantar fascial fibromatosis of right foot     728.71/M72.2      Plan  · Medications  o Medrol (Serafin) 4 mg oral tablets,dose pack   SIG: take as directed for 6 days   DISP: (1) Package with 0 refills  Prescribed on 05/07/2021     o Medications have been Reconciled  o Transition of Care or Provider Policy  · Instructions  o Handouts provided regarding Planter Fascitis.  o Overview: This patient has a plantar fasciitis.  o Discuss Findings: I have discussed the findings of this evaluation with the patient. The discussion included a complete verbal explanation of any changes in the examination results, diagnosis, and the current treatment plan. A schedule for future care needs was explained. If any questions should arise after returning home, I have encouraged the patient to feel free to contact Dr. Paul. The patient states understanding and agreement with this plan.  o Monitor For Problems: Pt to monitor for problems and to contact Dr. Paul for follow-up should such signs occur. Patient states understanding and agreement with this plan.  o Spenco: Recommend OTC Spenco Orthotics.  o Treatement Alternatives: Nonsurgical treatments almost always improve the pain. Treatment can last from several months to 2 years before symptoms get better. Most patients feel better in 9 months. Rarely Some people need surgery to relieve the pain.  o Conservative Treatment Recommendations: Anti-inflammatory medication to reduce pain and inflammation, Heel stretching exercises,   o Follow Up in 2 weeks.  o Discussed proper shoegear for the patient's feet and medical condition.  o Electronically Identified Patient Education Materials Provided Electronically  · Disposition  o Call or Return if symptoms worsen or persist.  · Referrals  o ID: [id number] Date: 05/03/2021 Type: Inbound  Specialty: Podiatry            Electronically Signed by: Kevin  polydipsia and polyuria  Genitourinary: Negative for decreased urine volume, difficulty urinating, dysuria and frequency  Musculoskeletal: Negative for back pain, neck pain and neck stiffness  Skin: Negative for color change and rash  Allergic/Immunologic: Negative for environmental allergies and immunocompromised state  Neurological: Negative for dizziness and headaches  Hematological: Negative for adenopathy  Does not bruise/bleed easily  Psychiatric/Behavioral: Negative for dysphoric mood  The patient is not nervous/anxious  Physical Exam  Physical Exam  Vitals and nursing note reviewed  Constitutional:       General: She is not in acute distress  Appearance: She is well-developed  She is not diaphoretic  HENT:      Head: Normocephalic and atraumatic  Nose: Nose normal    Eyes:      General: No scleral icterus  Conjunctiva/sclera: Conjunctivae normal       Pupils: Pupils are equal, round, and reactive to light  Neck:      Thyroid: No thyromegaly  Vascular: No JVD  Trachea: No tracheal deviation  Cardiovascular:      Rate and Rhythm: Normal rate and regular rhythm  Heart sounds: Normal heart sounds  No friction rub  No gallop  Pulmonary:      Effort: Pulmonary effort is normal  No respiratory distress  Breath sounds: Normal breath sounds  No wheezing or rales  Chest:      Chest wall: No tenderness  Abdominal:      General: Bowel sounds are normal  There is no distension  Palpations: Abdomen is soft  There is no mass  Tenderness: There is no abdominal tenderness  There is no guarding or rebound  Hernia: No hernia is present  Musculoskeletal:         General: No tenderness or deformity  Normal range of motion  Cervical back: Normal range of motion and neck supple  Skin:     General: Skin is warm and dry  Findings: No erythema  Neurological:      Mental Status: She is alert and oriented to person, place, and time  Cranial Nerves: No cranial nerve deficit  Coordination: Coordination normal       Deep Tendon Reflexes: Reflexes are normal and symmetric  Psychiatric:         Behavior: Behavior normal          Vital Signs  ED Triage Vitals   Temperature Pulse Respirations Blood Pressure SpO2   11/14/22 2327 11/14/22 2327 11/14/22 2327 11/14/22 2327 11/14/22 2327   97 8 °F (36 6 °C) (!) 117 18 162/85 97 %      Temp Source Heart Rate Source Patient Position - Orthostatic VS BP Location FiO2 (%)   11/14/22 2327 11/14/22 2327 11/14/22 2327 11/14/22 2327 --   Oral Monitor Lying Right arm       Pain Score       11/15/22 0030       No Pain           Vitals:    11/14/22 2327 11/15/22 0000 11/15/22 0030   BP: 162/85 130/60 140/69   Pulse: (!) 117 105 (!) 112   Patient Position - Orthostatic VS: Lying Sitting Sitting         Visual Acuity      ED Medications  Medications   albuterol inhalation solution 5 mg (5 mg Nebulization Given 11/14/22 2348)   ipratropium (ATROVENT) 0 02 % inhalation solution 0 5 mg (0 5 mg Nebulization Given 11/14/22 2348)   predniSONE tablet 40 mg (40 mg Oral Given 11/15/22 0124)   albuterol (PROVENTIL HFA,VENTOLIN HFA) inhaler 2 puff (2 puffs Inhalation Given 11/15/22 0125)   amoxicillin (AMOXIL) capsule 1,000 mg (1,000 mg Oral Given 11/15/22 0124)       Diagnostic Studies  Results Reviewed     None                 XR chest 2 views   Final Result by Toni Coker MD (11/15 1129)      No acute cardiopulmonary disease                    Workstation performed: [id number]                    Procedures  Procedures         ED Course                                             MDM  Number of Diagnoses or Management Options  Acute bronchitis: new and requires workup  Diagnosis management comments: Feels better after albuterol  Will dc  Return precautions       Amount and/or Complexity of Data Reviewed  Tests in the radiology section of CPT®: reviewed and ordered    Patient Progress  Patient progress: NATE Paul -Author on May 7, 2021 09:36:09 AM   stable      Disposition  Final diagnoses:   Acute bronchitis     Time reflects when diagnosis was documented in both MDM as applicable and the Disposition within this note     Time User Action Codes Description Comment    11/15/2022  1:08 AM Vaibhav Pearson Add [J20 9] Acute bronchitis       ED Disposition     ED Disposition   Discharge    Condition   Stable    Date/Time   Tue Nov 15, 2022  1:08 AM    Comment   Niels Leyva discharge to home/self care                 Follow-up Information    None         Discharge Medication List as of 11/15/2022  1:13 AM      START taking these medications    Details   amoxicillin (AMOXIL) 500 mg capsule Take 2 capsules (1,000 mg total) by mouth every 8 (eight) hours for 7 days, Starting Tue 11/15/2022, Until Tue 11/22/2022, Normal      predniSONE 20 mg tablet Take 2 tablets (40 mg total) by mouth daily for 5 days, Starting Tue 11/15/2022, Until Sun 11/20/2022, Normal         CONTINUE these medications which have NOT CHANGED    Details   atorvastatin (LIPITOR) 10 mg tablet Take 10 mg by mouth daily , Starting Thu 7/29/2021, Historical Med      Blood Glucose Monitoring Suppl (Contour Next EZ) w/Device KIT Starting Mon 8/2/2021, Historical Med      clotrimazole-betamethasone (LOTRISONE) 1-0 05 % cream Apply topically 2 (two) times a day, Starting Fri 7/8/2022, Normal      Continuous Blood Gluc  (Dexcom G6 ) LENA Use 1 Units in the morning, Starting Thu 5/19/2022, Normal      Continuous Blood Gluc Sensor (Dexcom G6 Sensor) MISC Use 1 Units in the morning, Starting Fri 7/8/2022, Normal      Continuous Blood Gluc Transmit (Dexcom G6 Transmitter) MISC As directed, Normal      Contour Next Test test strip Historical Med      Farxiga 10 MG TABS Take 1 tablet (10 mg total) by mouth daily, Starting Fri 7/8/2022, Normal      insulin glargine (Semglee) 100 units/mL subcutaneous injection Inject 10 Units under the skin daily at bedtime, Starting Mon 7/11/2022, Normal lisinopril (ZESTRIL) 10 mg tablet Take 1 tablet (10 mg total) by mouth in the morning , Starting Thu 5/19/2022, Normal      metFORMIN (GLUCOPHAGE) 500 mg tablet Take 1,000 mg by mouth 2 (two) times a day with meals , Starting Thu 7/29/2021, Historical Med      Microlet Lancets MISC Starting Mon 8/2/2021, Historical Med      !! venlafaxine (EFFEXOR-XR) 150 mg 24 hr capsule Take 1 capsule (150 mg total) by mouth daily, Starting Fri 7/8/2022, Normal      !! venlafaxine (EFFEXOR-XR) 75 mg 24 hr capsule Take 1 capsule (75 mg total) by mouth daily, Starting Fri 7/8/2022, Normal       !! - Potential duplicate medications found  Please discuss with provider  No discharge procedures on file      PDMP Review     None          ED Provider  Electronically Signed by           Juan Alberto Cole DO  11/15/22 6915

## 2022-12-21 ENCOUNTER — OFFICE VISIT (OUTPATIENT)
Dept: FAMILY MEDICINE CLINIC | Facility: CLINIC | Age: 47
End: 2022-12-21

## 2022-12-21 ENCOUNTER — HOSPITAL ENCOUNTER (OUTPATIENT)
Dept: GENERAL RADIOLOGY | Facility: HOSPITAL | Age: 47
Discharge: HOME | End: 2022-12-21

## 2022-12-21 ENCOUNTER — CLINICAL SUPPORT (OUTPATIENT)
Dept: FAMILY MEDICINE CLINIC | Facility: CLINIC | Age: 47
End: 2022-12-21

## 2022-12-21 VITALS
HEIGHT: 69 IN | DIASTOLIC BLOOD PRESSURE: 88 MMHG | TEMPERATURE: 98 F | HEART RATE: 67 BPM | BODY MASS INDEX: 31.52 KG/M2 | SYSTOLIC BLOOD PRESSURE: 138 MMHG | WEIGHT: 212.8 LBS | OXYGEN SATURATION: 99 %

## 2022-12-21 VITALS — HEART RATE: 68 BPM | SYSTOLIC BLOOD PRESSURE: 146 MMHG | DIASTOLIC BLOOD PRESSURE: 92 MMHG

## 2022-12-21 DIAGNOSIS — I10 PRIMARY HYPERTENSION: Primary | ICD-10-CM

## 2022-12-21 DIAGNOSIS — M54.50 ACUTE LEFT-SIDED LOW BACK PAIN WITHOUT SCIATICA: ICD-10-CM

## 2022-12-21 DIAGNOSIS — R06.02 SHORTNESS OF BREATH: ICD-10-CM

## 2022-12-21 DIAGNOSIS — R07.9 CHEST PAIN, UNSPECIFIED TYPE: ICD-10-CM

## 2022-12-21 DIAGNOSIS — R00.2 PALPITATIONS: ICD-10-CM

## 2022-12-21 DIAGNOSIS — I10 PRIMARY HYPERTENSION: ICD-10-CM

## 2022-12-21 DIAGNOSIS — M25.552 HIP PAIN, ACUTE, LEFT: ICD-10-CM

## 2022-12-21 LAB
BASOPHILS # BLD AUTO: 0.08 10*3/MM3 (ref 0–0.2)
BASOPHILS NFR BLD AUTO: 1 % (ref 0–1.5)
CK MB SERPL-CCNC: 2.5 NG/ML
D DIMER PPP FEU-MCNC: <0.27 MCGFEU/ML (ref 0–0.5)
DEPRECATED RDW RBC AUTO: 42.8 FL (ref 37–54)
EOSINOPHIL # BLD AUTO: 0.15 10*3/MM3 (ref 0–0.4)
EOSINOPHIL NFR BLD AUTO: 1.8 % (ref 0.3–6.2)
ERYTHROCYTE [DISTWIDTH] IN BLOOD BY AUTOMATED COUNT: 13.2 % (ref 12.3–15.4)
HCT VFR BLD AUTO: 44.5 % (ref 37.5–51)
HGB BLD-MCNC: 14.5 G/DL (ref 13–17.7)
IMM GRANULOCYTES # BLD AUTO: 0.05 10*3/MM3 (ref 0–0.05)
IMM GRANULOCYTES NFR BLD AUTO: 0.6 % (ref 0–0.5)
LYMPHOCYTES # BLD AUTO: 2.72 10*3/MM3 (ref 0.7–3.1)
LYMPHOCYTES NFR BLD AUTO: 32.3 % (ref 19.6–45.3)
MCH RBC QN AUTO: 28.5 PG (ref 26.6–33)
MCHC RBC AUTO-ENTMCNC: 32.6 G/DL (ref 31.5–35.7)
MCV RBC AUTO: 87.6 FL (ref 79–97)
MONOCYTES # BLD AUTO: 0.76 10*3/MM3 (ref 0.1–0.9)
MONOCYTES NFR BLD AUTO: 9 % (ref 5–12)
NEUTROPHILS NFR BLD AUTO: 4.65 10*3/MM3 (ref 1.7–7)
NEUTROPHILS NFR BLD AUTO: 55.3 % (ref 42.7–76)
NRBC BLD AUTO-RTO: 0 /100 WBC (ref 0–0.2)
PLATELET # BLD AUTO: 360 10*3/MM3 (ref 140–450)
PMV BLD AUTO: 9.8 FL (ref 6–12)
RBC # BLD AUTO: 5.08 10*6/MM3 (ref 4.14–5.8)
TROPONIN T SERPL-MCNC: <0.01 NG/ML (ref 0–0.03)
WBC NRBC COR # BLD: 8.41 10*3/MM3 (ref 3.4–10.8)

## 2022-12-21 PROCEDURE — 82553 CREATINE MB FRACTION: CPT | Performed by: NURSE PRACTITIONER

## 2022-12-21 PROCEDURE — 85025 COMPLETE CBC W/AUTO DIFF WBC: CPT | Performed by: NURSE PRACTITIONER

## 2022-12-21 PROCEDURE — 84484 ASSAY OF TROPONIN QUANT: CPT | Performed by: NURSE PRACTITIONER

## 2022-12-21 PROCEDURE — 93000 ELECTROCARDIOGRAM COMPLETE: CPT | Performed by: NURSE PRACTITIONER

## 2022-12-21 PROCEDURE — 85379 FIBRIN DEGRADATION QUANT: CPT | Performed by: NURSE PRACTITIONER

## 2022-12-21 PROCEDURE — 73502 X-RAY EXAM HIP UNI 2-3 VIEWS: CPT

## 2022-12-21 PROCEDURE — 71046 X-RAY EXAM CHEST 2 VIEWS: CPT

## 2022-12-21 PROCEDURE — 99214 OFFICE O/P EST MOD 30 MIN: CPT | Performed by: NURSE PRACTITIONER

## 2022-12-21 PROCEDURE — 72110 X-RAY EXAM L-2 SPINE 4/>VWS: CPT

## 2022-12-21 RX ORDER — ATENOLOL 25 MG/1
25 TABLET ORAL 2 TIMES DAILY
Qty: 180 TABLET | Refills: 1 | Status: SHIPPED | OUTPATIENT
Start: 2022-12-21 | End: 2023-01-20 | Stop reason: SDUPTHER

## 2022-12-21 NOTE — PROGRESS NOTES
Follow Up Office Visit      Patient Name: Avtar Chapman  : 1975   MRN: [id number]     Chief Complaint:    Chief Complaint   Patient presents with   • Hypertension   • Chest Pain   • Palpitations       History of Present Illness: Avtar Chapman is a 47 y.o. male who is here today for : Chest pain palpitations elevated blood pressure    Patient came in for a bp check 146/92, he says he has been having SOB of breath on exertion, and more tired than normal in the past few weeks  Also c/o palpitations intermittent     Patient states he hasn't felt like himself, a little anxiety with elevated blood pressure and palpitations    Also c/o left side low back pain wrapping around to hip started with intermittent chest pain shortness of breath started  No known injury  Currently attending pain  Management for chronic mid back and neck pain    Currently receiving testosterone injections 1x per week from and online provider       Subjective      Review of Systems:   Review of Systems   Constitutional: Negative for fever.   Respiratory: Positive for shortness of breath. Negative for cough.    Cardiovascular: Positive for chest pain and palpitations.   Gastrointestinal: Negative for nausea and vomiting.   Psychiatric/Behavioral: The patient is nervous/anxious.         Past Medical History:   Past Medical History:   Diagnosis Date   • Allergic rhinitis    • Depression 2018   • Foot pain    • GERD (gastroesophageal reflux disease)    • Hyperlipidemia 2017   • Hypertension    • Testosterone deficiency        Past Surgical History:   Past Surgical History:   Procedure Laterality Date   • COLONOSCOPY N/A 2022    Procedure: COLONOSCOPY;  Surgeon: Lai Augustin MD;  Location: [facility] ENDOSCOPY;  Service: General;  Laterality: N/A;  normal colon       Family History:   Family History   Problem Relation Age of Onset   • No Known Problems Mother    • Cancer Father    • Lung cancer Father    • Colon  "cancer Other    • Malig Hyperthermia Neg Hx        Social History:   Social History     Socioeconomic History   • Marital status: Single   Tobacco Use   • Smoking status: Former     Packs/day: 0.50     Types: Cigarettes     Start date:      Quit date: 2018     Years since quittin.9   • Smokeless tobacco: Never   • Tobacco comments:     quit smoking a month ago quit 2019   Vaping Use   • Vaping Use: Some days   • Substances: Nicotine, Flavoring   • Devices: Disposable   Substance and Sexual Activity   • Alcohol use: Yes     Comment: rarely   • Drug use: Never   • Sexual activity: Defer       Medications:     Current Outpatient Medications:   •  atenolol (TENORMIN) 25 MG tablet, Take 1 tablet by mouth 2 (Two) Times a Day., Disp: 180 tablet, Rfl: 1  •  HYDROcodone-acetaminophen (NORCO) 5-325 MG per tablet, Take 1 tablet by mouth Every 12 (Twelve) Hours As Needed., Disp: , Rfl:   •  lisinopril (PRINIVIL,ZESTRIL) 10 MG tablet, TAKE 1 TABLET BY MOUTH DAILY, Disp: 90 tablet, Rfl: 1  •  meloxicam (MOBIC) 15 MG tablet, Take 15 mg by mouth Daily As Needed., Disp: , Rfl:   •  Testosterone Cypionate (DEPOTESTOTERONE CYPIONATE) 200 MG/ML injection, Inject 200 mg into the appropriate muscle as directed by prescriber Every 28 (Twenty-Eight) Days., Disp: , Rfl:     Allergies:   No Known Allergies      Objective     Physical Exam:  Vital Signs:   Vitals:    22 1139 22 1157   BP: 138/96 138/88   Pulse: 67    Temp: 98 °F (36.7 °C)    SpO2: 99%    Weight: 96.5 kg (212 lb 12.8 oz)    Height: 175.3 cm (69\")      Body mass index is 31.43 kg/m².     Physical Exam  Cardiovascular:      Rate and Rhythm: Normal rate and regular rhythm.      Heart sounds: Normal heart sounds. No murmur heard.  Pulmonary:      Effort: Pulmonary effort is normal.      Breath sounds: Normal breath sounds.   Abdominal:      General: Bowel sounds are normal.      Palpations: Abdomen is soft.   Musculoskeletal:      Lumbar back: No swelling, " edema or tenderness. Negative right straight leg raise test and negative left straight leg raise test.      Right lower leg: No edema.      Left lower leg: No edema.      Comments: No pain with internal or external rotation of hip   Skin:     General: Skin is warm and dry.   Neurological:      Mental Status: He is alert.   Psychiatric:         Mood and Affect: Mood normal.         Behavior: Behavior normal.           ECG 12 Lead    Date/Time: 12/21/2022 12:08 PM  Performed by: Estevan Lezama APRN  Authorized by: Estevan Lezama APRN   Comparison: not compared with previous ECG   Rhythm: sinus rhythm  Comments: Borderline T abnormalities, inferior leads             Assessment / Plan      Assessment/Plan:   Diagnoses and all orders for this visit:    1. Primary hypertension (Primary)  -     Holter Monitor - 48 Hour; Future  -     D-dimer, Quantitative  -     proBNP; Future  -     CK-MB  -     Troponin  -     Comprehensive Metabolic Panel; Future  -     CBC Auto Differential  -     XR Chest PA & Lateral; Future  -     Ambulatory Referral to Cardiology    2. Chest pain, unspecified type  -     Holter Monitor - 48 Hour; Future  -     D-dimer, Quantitative  -     proBNP; Future  -     CK-MB  -     Troponin  -     Comprehensive Metabolic Panel; Future  -     CBC Auto Differential  -     XR Chest PA & Lateral; Future  -     ECG 12 Lead  -     Ambulatory Referral to Cardiology    3. Palpitations  -     Holter Monitor - 48 Hour; Future  -     D-dimer, Quantitative  -     proBNP; Future  -     CK-MB  -     Troponin  -     Comprehensive Metabolic Panel; Future  -     CBC Auto Differential  -     XR Chest PA & Lateral; Future  -     Ambulatory Referral to Cardiology    4. Shortness of breath  -     Holter Monitor - 48 Hour; Future  -     D-dimer, Quantitative  -     proBNP; Future  -     CK-MB  -     Troponin  -     Comprehensive Metabolic Panel; Future  -     CBC Auto Differential  -     XR Chest PA &  Lateral; Future  -     Ambulatory Referral to Cardiology    5. Acute left-sided low back pain without sciatica  -     XR Spine Lumbar 4+ View; Future    6. Hip pain, acute, left  -     XR Hip With or Without Pelvis 2 - 3 View Left; Future    Other orders  -     atenolol (TENORMIN) 25 MG tablet; Take 1 tablet by mouth 2 (Two) Times a Day.  Dispense: 180 tablet; Refill: 1       Hypertension chest pain palpitations shortness of breath will obtain Holter monitor labs refer to cardiology and chest x-ray we will call with results and further recommendations will increase atenolol to 25 mg twice daily recommend patient to stop testosterone supplementation until cleared by cardiology  Acute left low back pain will obtain x-ray and hip x-ray we will call with results and further recommendations physical therapy would likely benefit patient continue to follow with pain management at this time blood pressure check in 2 weeks follow-up in 1 month        Follow Up:   Return in about 1 month (around 1/21/2023).    FARA Clark

## 2022-12-21 NOTE — PROGRESS NOTES
Patient came in for a bp check 146/92, he says he has been having SOB of breath on exertion, and more tired than normal.  Patient states he hasn't felt like himself, a little anxiety    Patient states he has been having pain on left side low back going around to groin area

## 2022-12-22 DIAGNOSIS — M25.552 HIP PAIN, ACUTE, LEFT: Primary | ICD-10-CM

## 2023-01-09 ENCOUNTER — OFFICE VISIT (OUTPATIENT)
Dept: ORTHOPEDIC SURGERY | Facility: CLINIC | Age: 48
End: 2023-01-09
Payer: COMMERCIAL

## 2023-01-09 VITALS — HEIGHT: 69 IN | BODY MASS INDEX: 31.84 KG/M2 | WEIGHT: 215 LBS

## 2023-01-09 DIAGNOSIS — M54.42 LEFT-SIDED LOW BACK PAIN WITH LEFT-SIDED SCIATICA, UNSPECIFIED CHRONICITY: ICD-10-CM

## 2023-01-09 DIAGNOSIS — M46.1 SACROILIITIS: Primary | ICD-10-CM

## 2023-01-09 PROCEDURE — 99203 OFFICE O/P NEW LOW 30 MIN: CPT | Performed by: ORTHOPAEDIC SURGERY

## 2023-01-09 NOTE — PROGRESS NOTES
Chief Complaint  Initial Evaluation of the Left Hip     Subjective      Avtar Chapman presents to [facility] for initial evaluation of the left hip.  He has been having pain in the lateral hip and wraps around to his low back.  He has had pain for a little while and it had cleared up for a few days then started back in. He is having pain with prolonged sitting and sit to stand.  Noted stiffness in his hip and low back.     No Known Allergies     Social History     Socioeconomic History   • Marital status: Single   Tobacco Use   • Smoking status: Former     Packs/day: 0.50     Types: Cigarettes     Start date:      Quit date: 2018     Years since quittin.0   • Smokeless tobacco: Never   • Tobacco comments:     quit smoking a month ago quit 2019   Vaping Use   • Vaping Use: Some days   • Substances: Nicotine, Flavoring   • Devices: Disposable   Substance and Sexual Activity   • Alcohol use: Yes     Comment: rarely   • Drug use: Never   • Sexual activity: Defer        Review of Systems     Objective   Vital Signs:   Ht 175.3 cm (69\")   Wt 97.5 kg (215 lb)   BMI 31.75 kg/m²       Physical Exam  Constitutional:       Appearance: Normal appearance. Patient is well-developed and normal weight.   HENT:      Head: Normocephalic.      Right Ear: Hearing and external ear normal.      Left Ear: Hearing and external ear normal.      Nose: Nose normal.   Eyes:      Conjunctiva/sclera: Conjunctivae normal.   Cardiovascular:      Rate and Rhythm: Normal rate.   Pulmonary:      Effort: Pulmonary effort is normal.      Breath sounds: No wheezing or rales.   Abdominal:      Palpations: Abdomen is soft.      Tenderness: There is no abdominal tenderness.   Musculoskeletal:      Cervical back: Normal range of motion.   Skin:     Findings: No rash.   Neurological:      Mental Status: Patient  is alert and oriented to person, place, and time.   Psychiatric:         Mood and Affect: Mood and  affect normal.         Judgment: Judgment normal.       Ortho Exam      LEFT HIP Good tone of hip flexors, hip extensors, and hip abductors. Dorsal pedal pulse 2+. Posterior tibialis pulse is 2+. Sensation intact. Neurovascular Intact. No swelling. No skin discoloration or muscle atrophy. Full hip flexion.  No pain in the groin.  Pain the the lateral aspect of the hip and wraps around to low back at times.       Procedures        Imaging Results (Most Recent)     None           Result Review :     XR Hip With or Without Pelvis 2 - 3 View Left    Result Date: 12/21/2022  Narrative: PROCEDURE: XR HIP W OR WO PELVIS 2-3 VIEW LEFT  COMPARISON: None  INDICATIONS: LEFT LOW BACK PAIN, RADIATES TO LATERAL HIP. NO KNOWN INJURY.  FINDINGS:  There is no acute fracture or or dislocation.  The SI joints and pubic symphysis appear normally aligned.  There is calcified phleboliths within the right pelvis.  The ilioischial and iliopectineal lines appear intact.  There is mild medial joint space narrowing of the left hip.      Impression:   1. Mild medial joint space narrowing of the left hip.       JASPAL VERA MD       Electronically Signed and Approved By: JASPAL VERA MD on 12/21/2022 at 18:32                      Assessment and Plan     Diagnoses and all orders for this visit:    1. Sacroiliitis (HCC) (Primary)  -     Ambulatory Referral to Physical Therapy Evaluate and treat, Ortho    2. Left-sided low back pain with left-sided sciatica, unspecified chronicity  -     Ambulatory Referral to Physical Therapy Evaluate and treat, Ortho        Discussed the treatment plan with the patient. Discussed conservative measures as exercises, anti-inflammatory and injection. He was prescribed physical therapy and to remain on his mobic.     Call or return if worsening symptoms.    Follow Up     PRN      Patient was given instructions and counseling regarding his condition or for health maintenance advice. Please see specific  information pulled into the AVS if appropriate.     Scribed for Gokul Causey MD by Carmen Jordan MA.  01/09/23   09:16 EST      I have personally performed the services described in this document as scribed by the above individual and it is both accurate and complete. Gokul Causey MD 01/09/23

## 2023-01-10 ENCOUNTER — PATIENT ROUNDING (BHMG ONLY) (OUTPATIENT)
Dept: ORTHOPEDIC SURGERY | Facility: CLINIC | Age: 48
End: 2023-01-10
Payer: COMMERCIAL

## 2023-01-10 NOTE — PROGRESS NOTES
A [employer] message has been sent to the patient for PATIENT ROUNDING with [facility].

## 2023-01-20 ENCOUNTER — OFFICE VISIT (OUTPATIENT)
Dept: FAMILY MEDICINE CLINIC | Facility: CLINIC | Age: 48
End: 2023-01-20
Payer: COMMERCIAL

## 2023-01-20 VITALS
TEMPERATURE: 97.6 F | OXYGEN SATURATION: 99 % | DIASTOLIC BLOOD PRESSURE: 74 MMHG | HEIGHT: 69 IN | WEIGHT: 216 LBS | HEART RATE: 65 BPM | SYSTOLIC BLOOD PRESSURE: 126 MMHG | BODY MASS INDEX: 31.99 KG/M2

## 2023-01-20 DIAGNOSIS — R79.89 LOW TESTOSTERONE IN MALE: ICD-10-CM

## 2023-01-20 DIAGNOSIS — E29.1 HYPOGONADISM IN MALE: ICD-10-CM

## 2023-01-20 DIAGNOSIS — E78.2 MIXED HYPERLIPIDEMIA: ICD-10-CM

## 2023-01-20 DIAGNOSIS — R07.9 CHEST PAIN, UNSPECIFIED TYPE: ICD-10-CM

## 2023-01-20 DIAGNOSIS — I10 PRIMARY HYPERTENSION: Primary | ICD-10-CM

## 2023-01-20 DIAGNOSIS — R73.01 IMPAIRED FASTING GLUCOSE: ICD-10-CM

## 2023-01-20 DIAGNOSIS — R06.02 SHORTNESS OF BREATH: ICD-10-CM

## 2023-01-20 DIAGNOSIS — R00.2 PALPITATIONS: ICD-10-CM

## 2023-01-20 PROBLEM — Z79.891 LONG TERM (CURRENT) USE OF OPIATE ANALGESIC: Status: ACTIVE | Noted: 2022-03-18

## 2023-01-20 LAB
ALBUMIN SERPL-MCNC: 4.6 G/DL (ref 3.5–5.2)
ALBUMIN UR-MCNC: 1.4 MG/DL
ALBUMIN/GLOB SERPL: 1.7 G/DL
ALP SERPL-CCNC: 50 U/L (ref 39–117)
ALT SERPL W P-5'-P-CCNC: 27 U/L (ref 1–41)
ANION GAP SERPL CALCULATED.3IONS-SCNC: 6.8 MMOL/L (ref 5–15)
AST SERPL-CCNC: 19 U/L (ref 1–40)
BACTERIA UR QL AUTO: ABNORMAL /HPF
BASOPHILS # BLD AUTO: 0.09 10*3/MM3 (ref 0–0.2)
BASOPHILS NFR BLD AUTO: 1 % (ref 0–1.5)
BILIRUB SERPL-MCNC: 0.3 MG/DL (ref 0–1.2)
BILIRUB UR QL STRIP: NEGATIVE
BUN SERPL-MCNC: 10 MG/DL (ref 6–20)
BUN/CREAT SERPL: 7.5 (ref 7–25)
CALCIUM SPEC-SCNC: 10 MG/DL (ref 8.6–10.5)
CHLORIDE SERPL-SCNC: 97 MMOL/L (ref 98–107)
CHOLEST SERPL-MCNC: 193 MG/DL (ref 0–200)
CLARITY UR: CLEAR
CO2 SERPL-SCNC: 32.2 MMOL/L (ref 22–29)
COLOR UR: ABNORMAL
CREAT SERPL-MCNC: 1.34 MG/DL (ref 0.76–1.27)
CREAT UR-MCNC: 332.6 MG/DL
DEPRECATED RDW RBC AUTO: 39.8 FL (ref 37–54)
EGFRCR SERPLBLD CKD-EPI 2021: 66.2 ML/MIN/1.73
EOSINOPHIL # BLD AUTO: 0.3 10*3/MM3 (ref 0–0.4)
EOSINOPHIL NFR BLD AUTO: 3.3 % (ref 0.3–6.2)
ERYTHROCYTE [DISTWIDTH] IN BLOOD BY AUTOMATED COUNT: 12.8 % (ref 12.3–15.4)
GLOBULIN UR ELPH-MCNC: 2.7 GM/DL
GLUCOSE SERPL-MCNC: 93 MG/DL (ref 65–99)
GLUCOSE UR STRIP-MCNC: NEGATIVE MG/DL
HBA1C MFR BLD: 5.4 % (ref 4.8–5.6)
HCT VFR BLD AUTO: 46.9 % (ref 37.5–51)
HDLC SERPL-MCNC: 29 MG/DL (ref 40–60)
HGB BLD-MCNC: 15.7 G/DL (ref 13–17.7)
HGB UR QL STRIP.AUTO: NEGATIVE
HYALINE CASTS UR QL AUTO: ABNORMAL /LPF
IMM GRANULOCYTES # BLD AUTO: 0.02 10*3/MM3 (ref 0–0.05)
IMM GRANULOCYTES NFR BLD AUTO: 0.2 % (ref 0–0.5)
KETONES UR QL STRIP: ABNORMAL
LDLC SERPL CALC-MCNC: 105 MG/DL (ref 0–100)
LDLC/HDLC SERPL: 3.29 {RATIO}
LEUKOCYTE ESTERASE UR QL STRIP.AUTO: ABNORMAL
LYMPHOCYTES # BLD AUTO: 2.97 10*3/MM3 (ref 0.7–3.1)
LYMPHOCYTES NFR BLD AUTO: 33 % (ref 19.6–45.3)
MCH RBC QN AUTO: 29 PG (ref 26.6–33)
MCHC RBC AUTO-ENTMCNC: 33.5 G/DL (ref 31.5–35.7)
MCV RBC AUTO: 86.5 FL (ref 79–97)
MICROALBUMIN/CREAT UR: 4.2 MG/G
MONOCYTES # BLD AUTO: 0.9 10*3/MM3 (ref 0.1–0.9)
MONOCYTES NFR BLD AUTO: 10 % (ref 5–12)
NEUTROPHILS NFR BLD AUTO: 4.71 10*3/MM3 (ref 1.7–7)
NEUTROPHILS NFR BLD AUTO: 52.5 % (ref 42.7–76)
NITRITE UR QL STRIP: NEGATIVE
NRBC BLD AUTO-RTO: 0 /100 WBC (ref 0–0.2)
NT-PROBNP SERPL-MCNC: <5 PG/ML (ref 0–450)
PH UR STRIP.AUTO: 6 [PH] (ref 5–8)
PLATELET # BLD AUTO: 335 10*3/MM3 (ref 140–450)
PMV BLD AUTO: 10.2 FL (ref 6–12)
POTASSIUM SERPL-SCNC: 3.9 MMOL/L (ref 3.5–5.2)
PROT SERPL-MCNC: 7.3 G/DL (ref 6–8.5)
PROT UR QL STRIP: NEGATIVE
PSA SERPL-MCNC: 1.16 NG/ML (ref 0–4)
RBC # BLD AUTO: 5.42 10*6/MM3 (ref 4.14–5.8)
RBC # UR STRIP: ABNORMAL /HPF
REF LAB TEST METHOD: ABNORMAL
SODIUM SERPL-SCNC: 136 MMOL/L (ref 136–145)
SP GR UR STRIP: 1.02 (ref 1–1.03)
SQUAMOUS #/AREA URNS HPF: ABNORMAL /HPF
TRIGL SERPL-MCNC: 343 MG/DL (ref 0–150)
UROBILINOGEN UR QL STRIP: ABNORMAL
VLDLC SERPL-MCNC: 59 MG/DL (ref 5–40)
WBC # UR STRIP: ABNORMAL /HPF
WBC NRBC COR # BLD: 8.99 10*3/MM3 (ref 3.4–10.8)

## 2023-01-20 PROCEDURE — 84402 ASSAY OF FREE TESTOSTERONE: CPT | Performed by: NURSE PRACTITIONER

## 2023-01-20 PROCEDURE — 85025 COMPLETE CBC W/AUTO DIFF WBC: CPT | Performed by: NURSE PRACTITIONER

## 2023-01-20 PROCEDURE — 99214 OFFICE O/P EST MOD 30 MIN: CPT | Performed by: NURSE PRACTITIONER

## 2023-01-20 PROCEDURE — 84153 ASSAY OF PSA TOTAL: CPT | Performed by: NURSE PRACTITIONER

## 2023-01-20 PROCEDURE — 84403 ASSAY OF TOTAL TESTOSTERONE: CPT | Performed by: NURSE PRACTITIONER

## 2023-01-20 PROCEDURE — 80061 LIPID PANEL: CPT | Performed by: NURSE PRACTITIONER

## 2023-01-20 PROCEDURE — 83036 HEMOGLOBIN GLYCOSYLATED A1C: CPT | Performed by: NURSE PRACTITIONER

## 2023-01-20 PROCEDURE — 83880 ASSAY OF NATRIURETIC PEPTIDE: CPT | Performed by: NURSE PRACTITIONER

## 2023-01-20 PROCEDURE — 80053 COMPREHEN METABOLIC PANEL: CPT | Performed by: NURSE PRACTITIONER

## 2023-01-20 PROCEDURE — 82570 ASSAY OF URINE CREATININE: CPT | Performed by: NURSE PRACTITIONER

## 2023-01-20 PROCEDURE — 82043 UR ALBUMIN QUANTITATIVE: CPT | Performed by: NURSE PRACTITIONER

## 2023-01-20 PROCEDURE — 81001 URINALYSIS AUTO W/SCOPE: CPT | Performed by: NURSE PRACTITIONER

## 2023-01-20 RX ORDER — LISINOPRIL 10 MG/1
10 TABLET ORAL DAILY
Qty: 90 TABLET | Refills: 1 | Status: SHIPPED | OUTPATIENT
Start: 2023-01-20

## 2023-01-20 RX ORDER — SULFAMETHOXAZOLE AND TRIMETHOPRIM 800; 160 MG/1; MG/1
TABLET ORAL
COMMUNITY

## 2023-01-20 RX ORDER — POLYETHYLENE GLYCOL 3350 17 G/17G
POWDER, FOR SOLUTION ORAL
COMMUNITY

## 2023-01-20 RX ORDER — ATENOLOL 25 MG/1
25 TABLET ORAL 2 TIMES DAILY
Qty: 180 TABLET | Refills: 1 | Status: SHIPPED | OUTPATIENT
Start: 2023-01-20

## 2023-01-20 NOTE — PROGRESS NOTES
Follow Up Office Visit      Patient Name: Avtar Chapman  : 1975   MRN: [id number]     Chief Complaint:    Chief Complaint   Patient presents with   • Hypertension   • Hyperlipidemia   • Shortness of Breath   • Palpitations       History of Present Illness: Avtar Chapman is a 47 y.o. male who is here today to follow up for HTN, palpitation, shortness of breath and chest pain   Review lab results     Follow up increase atenolol to 25 mg BID  holter monitor and cardiology appointment 2023      psa-2022  Colonoscopy-2022        Subjective      Review of Systems:   Review of Systems   Constitutional: Negative for fever.   Respiratory: Negative for cough and shortness of breath.    Cardiovascular: Negative for chest pain.   Neurological: Negative for headaches.        Past Medical History:   Past Medical History:   Diagnosis Date   • Allergic rhinitis    • Depression 2018   • Foot pain    • GERD (gastroesophageal reflux disease)    • Hyperlipidemia 2017   • Hypertension    • Testosterone deficiency        Past Surgical History:   Past Surgical History:   Procedure Laterality Date   • COLONOSCOPY N/A 2022    Procedure: COLONOSCOPY;  Surgeon: Lai Augustin MD;  Location: [facility] ENDOSCOPY;  Service: General;  Laterality: N/A;  normal colon       Family History:   Family History   Problem Relation Age of Onset   • No Known Problems Mother    • Cancer Father    • Lung cancer Father    • Colon cancer Other    • Malig Hyperthermia Neg Hx        Social History:   Social History     Socioeconomic History   • Marital status: Single   Tobacco Use   • Smoking status: Former     Packs/day: 0.50     Types: Cigarettes     Start date:      Quit date: 2018     Years since quittin.0   • Smokeless tobacco: Never   • Tobacco comments:     quit smoking a month ago quit 2019   Vaping Use   • Vaping Use: Some days   • Substances: Nicotine, Flavoring   • Devices: Disposable   Substance  "and Sexual Activity   • Alcohol use: Yes     Comment: rarely   • Drug use: Never   • Sexual activity: Defer       Medications:     Current Outpatient Medications:   •  atenolol (TENORMIN) 25 MG tablet, Take 1 tablet by mouth 2 (Two) Times a Day., Disp: 180 tablet, Rfl: 1  •  HYDROcodone-acetaminophen (NORCO) 5-325 MG per tablet, Take 1 tablet by mouth Every 12 (Twelve) Hours As Needed., Disp: , Rfl:   •  lisinopril (PRINIVIL,ZESTRIL) 10 MG tablet, Take 1 tablet by mouth Daily., Disp: 90 tablet, Rfl: 1  •  meloxicam (MOBIC) 15 MG tablet, Take 15 mg by mouth Daily As Needed., Disp: , Rfl:   •  polyethylene glycol (MIRALAX) 17 GM/SCOOP powder, polyethylene glycol 3350 17 gram/dose oral powder, Disp: , Rfl:   •  Testosterone Cypionate (DEPOTESTOTERONE CYPIONATE) 200 MG/ML injection, Inject 200 mg into the appropriate muscle as directed by prescriber Every 28 (Twenty-Eight) Days., Disp: , Rfl:   •  sulfamethoxazole-trimethoprim (BACTRIM DS,SEPTRA DS) 800-160 MG per tablet, sulfamethoxazole 800 mg-trimethoprim 160 mg tablet  TAKE 1 TABLET BY MOUTH EVERY DAY, Disp: , Rfl:     Allergies:   No Known Allergies        Objective     Physical Exam:  Vital Signs:   Vitals:    01/20/23 1338   BP: 126/74   Pulse: 65   Temp: 97.6 °F (36.4 °C)   SpO2: 99%   Weight: 98 kg (216 lb)   Height: 175.3 cm (69\")     Body mass index is 31.9 kg/m².     Physical Exam  Neck:      Vascular: No carotid bruit.   Cardiovascular:      Rate and Rhythm: Normal rate and regular rhythm.      Heart sounds: Normal heart sounds. No murmur heard.  Pulmonary:      Effort: Pulmonary effort is normal.      Breath sounds: Normal breath sounds.   Abdominal:      General: Bowel sounds are normal.      Palpations: Abdomen is soft.   Musculoskeletal:      Right lower leg: No edema.      Left lower leg: No edema.   Skin:     General: Skin is warm and dry.   Neurological:      Mental Status: He is alert.   Psychiatric:         Behavior: Behavior normal. " "            Assessment / Plan      Assessment/Plan:   Diagnoses and all orders for this visit:    1. Primary hypertension (Primary)    2. Palpitations    3. Shortness of breath    4. Chest pain, unspecified type    5. Mixed hyperlipidemia  -     Lipid Panel  -     Comprehensive Metabolic Panel; Future    6. Impaired fasting glucose  -     Comprehensive Metabolic Panel; Future  -     Hemoglobin A1c  -     Microalbumin / Creatinine Urine Ratio - Urine, Clean Catch  -     Urinalysis With Culture If Indicated -    7. Hypogonadism in male  -     PSA DIAGNOSTIC  -     Testosterone, Free, Total  -     CBC Auto Differential    8. Low testosterone in male    Other orders  -     atenolol (TENORMIN) 25 MG tablet; Take 1 tablet by mouth 2 (Two) Times a Day.  Dispense: 180 tablet; Refill: 1  -     lisinopril (PRINIVIL,ZESTRIL) 10 MG tablet; Take 1 tablet by mouth Daily.  Dispense: 90 tablet; Refill: 1    HTN currently controlled lisinopril 10 mg daily atenolol twice daily denies palpitations at this time  Shortness of breath with obtain labs echocardiogram scheduled  Chest pain resolved recommend to keep follow with cardiology  Hyperlipidemia will obtain lipid panel recommend decrease fried food red meat pork products cheese exercise 30 minutes increase fruits vegetables and whole grains exercise 30 minutes daily   Impaired fasting glucose will obtain hgb a1c recommend decrease carb intake and exercise 30 minutes daily decrease weigth  Hypogonadism and low testosterone in male we will obtain labs to monitor current supplementation as prescribed by another provider recommend again to discontinue use until after cleared by cardiology        Follow Up:   Return in about 6 months (around 7/20/2023).    Reinaldo Lezama, FARA    \"Please note that portions of this note were completed with a voice recognition program.\"    "

## 2023-01-27 LAB
TESTOST FREE SERPL-MCNC: >50 PG/ML (ref 6.8–21.5)
TESTOST SERPL-MCNC: >1500 NG/DL (ref 264–916)

## 2023-01-30 ENCOUNTER — HOSPITAL ENCOUNTER (OUTPATIENT)
Dept: CARDIOLOGY | Facility: HOSPITAL | Age: 48
Discharge: HOME | End: 2023-01-30
Admitting: NURSE PRACTITIONER
Payer: COMMERCIAL

## 2023-01-30 ENCOUNTER — OFFICE VISIT (OUTPATIENT)
Dept: CARDIOLOGY | Facility: CLINIC | Age: 48
End: 2023-01-30
Payer: COMMERCIAL

## 2023-01-30 VITALS
HEART RATE: 66 BPM | BODY MASS INDEX: 32.17 KG/M2 | DIASTOLIC BLOOD PRESSURE: 78 MMHG | WEIGHT: 217.2 LBS | SYSTOLIC BLOOD PRESSURE: 128 MMHG | HEIGHT: 69 IN

## 2023-01-30 DIAGNOSIS — I10 PRIMARY HYPERTENSION: ICD-10-CM

## 2023-01-30 DIAGNOSIS — R07.9 CHEST PAIN, UNSPECIFIED TYPE: ICD-10-CM

## 2023-01-30 DIAGNOSIS — R00.2 PALPITATIONS: ICD-10-CM

## 2023-01-30 DIAGNOSIS — R06.09 DYSPNEA ON EXERTION: ICD-10-CM

## 2023-01-30 DIAGNOSIS — I10 HYPERTENSION, ESSENTIAL: Primary | ICD-10-CM

## 2023-01-30 DIAGNOSIS — R06.02 SHORTNESS OF BREATH: ICD-10-CM

## 2023-01-30 PROCEDURE — 99204 OFFICE O/P NEW MOD 45 MIN: CPT | Performed by: INTERNAL MEDICINE

## 2023-01-30 PROCEDURE — 93225 XTRNL ECG REC<48 HRS REC: CPT

## 2023-01-30 NOTE — PROGRESS NOTES
Chief Complaint  Hypertension, Palpitations, Chest Pain (Patient states no chest pain.), and Shortness of Breath    Subjective            Avtar Chapman presents to [facility]  History of Present Illness    47-year-old white male.  He has no previous cardiac history.  He has had some elevated blood pressure and at times dizziness and mild shortness of breath with some palpitations during the elevated blood pressure.  Symptoms are nonexertional and he feels better with exercise.  No syncope.  He is currently wearing a Holter monitor.  He recently had adjustment in his ACE inhibitor dose.  He has gained some weight over the past 2 years.  He is drinking a lot of regular soda as well.    PMH  Past Medical History:   Diagnosis Date   • Allergic rhinitis    • Asthma    • Depression 2018   • Foot pain    • GERD (gastroesophageal reflux disease)    • Hyperlipidemia 2017   • Hypertension    • Testosterone deficiency          SURGICALHX  Past Surgical History:   Procedure Laterality Date   • COLONOSCOPY N/A 2022    Procedure: COLONOSCOPY;  Surgeon: Lai Augustin MD;  Location: [facility] ENDOSCOPY;  Service: General;  Laterality: N/A;  normal colon        SOC  Social History     Socioeconomic History   • Marital status: Single   Tobacco Use   • Smoking status: Former     Packs/day: 0.50     Types: Cigarettes     Start date: 1990     Quit date: 2018     Years since quittin.0   • Smokeless tobacco: Never   • Tobacco comments:     quit smoking a month ago quit 2019   Vaping Use   • Vaping Use: Some days   • Substances: Nicotine, Flavoring   • Devices: Disposable   Substance and Sexual Activity   • Alcohol use: Not Currently     Comment: rarely   • Drug use: Never   • Sexual activity: Defer         FAMHX  Family History   Problem Relation Age of Onset   • No Known Problems Mother    • Cancer Father    • Lung cancer Father    • Colon cancer Other    • Malig Hyperthermia  "Neg Hx           ALLERGY  No Known Allergies     MEDSCURRENT    Current Outpatient Medications:   •  atenolol (TENORMIN) 25 MG tablet, Take 1 tablet by mouth 2 (Two) Times a Day., Disp: 180 tablet, Rfl: 1  •  HYDROcodone-acetaminophen (NORCO) 5-325 MG per tablet, Take 1 tablet by mouth Every 12 (Twelve) Hours As Needed., Disp: , Rfl:   •  lisinopril (PRINIVIL,ZESTRIL) 10 MG tablet, Take 1 tablet by mouth Daily., Disp: 90 tablet, Rfl: 1  •  meloxicam (MOBIC) 15 MG tablet, Take 15 mg by mouth Daily As Needed., Disp: , Rfl:   •  Testosterone Cypionate (DEPOTESTOTERONE CYPIONATE) 200 MG/ML injection, Inject 200 mg into the appropriate muscle as directed by prescriber Every 28 (Twenty-Eight) Days., Disp: , Rfl:   •  polyethylene glycol (MIRALAX) 17 GM/SCOOP powder, polyethylene glycol 3350 17 gram/dose oral powder, Disp: , Rfl:   •  sulfamethoxazole-trimethoprim (BACTRIM DS,SEPTRA DS) 800-160 MG per tablet, sulfamethoxazole 800 mg-trimethoprim 160 mg tablet  TAKE 1 TABLET BY MOUTH EVERY DAY, Disp: , Rfl:       Review of Systems   Constitutional: Negative.   HENT: Negative.    Eyes: Negative.    Cardiovascular: Positive for dyspnea on exertion and palpitations. Negative for chest pain.   Respiratory: Positive for shortness of breath.    Endocrine: Negative.    Hematologic/Lymphatic: Negative.    Skin: Negative.    Musculoskeletal: Negative.    Gastrointestinal: Negative.    Genitourinary: Negative.    Neurological: Negative.    Psychiatric/Behavioral: Negative.         Objective     /84   Pulse 66   Ht 175.3 cm (69\")   Wt 98.5 kg (217 lb 3.2 oz)   BMI 32.07 kg/m²       General Appearance:   · well developed  · well nourished  HENT:   · oropharynx moist  · lips not cyanotic  Neck:  · thyroid not enlarged  · supple  Respiratory:  · no respiratory distress  · normal breath sounds  · no rales  Cardiovascular:  · no jugular venous distention  · regular rhythm  · apical impulse normal  · S1 normal, S2 normal  · no S3, " no S4   · no murmur  · no rub, no thrill  · carotid pulses normal; no bruit  · pedal pulses normal  · lower extremity edema: none    Musculoskeletal:  · no clubbing of fingers.   · normocephalic, head atraumatic  Skin:   · warm, dry  Psychiatric:  · judgement and insight appropriate  · normal mood and affect      Result Review :     The following data was reviewed by: Gerald Lynch MD on 01/30/2023:    CMP    CMP 2/7/22 8/5/22 1/20/23   Glucose 75 79 93   BUN 10 10 10   Creatinine 1.14 1.08 1.34 (A)   eGFR Non  Am 69     eGFR  86.2 66.2   Sodium 139 143 136   Potassium 4.2 4.2 3.9   Chloride 100 104 97 (A)   Calcium 9.7 9.4 10.0   Total Protein 7.2 6.6 7.3   Albumin 4.40 4.10 4.6   Globulin 2.8 2.5 2.7   Total Bilirubin 0.6 0.4 0.3   Alkaline Phosphatase 57 43 50   AST (SGOT) 26 18 19   ALT (SGPT) 29 22 27   Albumin/Globulin Ratio 1.6 1.6 1.7   BUN/Creatinine Ratio 8.8 9.3 7.5   Anion Gap 10.5 12.7 6.8   (A) Abnormal value       Comments are available for some flowsheets but are not being displayed.           CBC    CBC 8/5/22 12/21/22 1/20/23   WBC 9.13 8.41 8.99   RBC 4.94 5.08 5.42   Hemoglobin 14.0 14.5 15.7   Hematocrit 43.5 44.5 46.9   MCV 88.1 87.6 86.5   MCH 28.3 28.5 29.0   MCHC 32.2 32.6 33.5   RDW 12.9 13.2 12.8   Platelets 311 360 335           Lipid Panel    Lipid Panel 2/7/22 8/5/22 1/20/23   Total Cholesterol 175 163 193   Triglycerides 173 (A) 211 (A) 343 (A)   HDL Cholesterol 29 (A) 29 (A) 29 (A)   VLDL Cholesterol 31 36 59 (A)   LDL Cholesterol  115 (A) 98 105 (A)   LDL/HDL Ratio 3.84 3.17 3.29   (A) Abnormal value            TSH    TSH 2/7/22 8/5/22   TSH 1.520 1.440             Data reviewed: Primary care records reviewed, EKG December 22 showed sinus rhythm rate 57 with nonspecific T wave flattening.  No previous tracing for comparison     Procedures               Assessment and Plan        ASSESSMENT:  Encounter Diagnoses   Name Primary?   • Hypertension, essential Yes   •  Dyspnea on exertion          PLAN:    1.  Essential hypertension- mildly uncontrolled.  I discussed lifestyle modifications with the patient.  I told him to come off of regular soda, track his calories and stay at 1900 nel/day, try to lose 17 pounds over the next 6 months and drink more water.  Continue current medical therapy.  I think this will help his dyspnea and hypertension control.  2.  We will review his Holter when available  3.  Follow-up in 4 months to follow-up on progress          Patient was given instructions and counseling regarding his condition or for health maintenance advice. Please see specific information pulled into the AVS if appropriate.             PIOTR Lynch MD  1/30/2023    13:06 EST

## 2023-02-05 LAB
MAXIMAL PREDICTED HEART RATE: 174 BPM
STRESS TARGET HR: 148 BPM

## 2023-02-06 ENCOUNTER — TELEPHONE (OUTPATIENT)
Dept: FAMILY MEDICINE CLINIC | Facility: CLINIC | Age: 48
End: 2023-02-06
Payer: COMMERCIAL

## 2023-05-30 ENCOUNTER — OFFICE VISIT (OUTPATIENT)
Dept: CARDIOLOGY | Facility: CLINIC | Age: 48
End: 2023-05-30

## 2023-05-30 VITALS
BODY MASS INDEX: 32.02 KG/M2 | HEIGHT: 69 IN | WEIGHT: 216.2 LBS | SYSTOLIC BLOOD PRESSURE: 128 MMHG | DIASTOLIC BLOOD PRESSURE: 79 MMHG | HEART RATE: 65 BPM

## 2023-05-30 DIAGNOSIS — R00.2 PALPITATIONS: ICD-10-CM

## 2023-05-30 DIAGNOSIS — I10 HYPERTENSION, ESSENTIAL: Primary | ICD-10-CM

## 2023-05-30 DIAGNOSIS — R06.09 DYSPNEA ON EXERTION: ICD-10-CM

## 2023-05-30 NOTE — PROGRESS NOTES
Chief Complaint  Hypertension and Follow-up    Subjective            Avtar Chapman presents to [facility] CARDIOLOGY  History of Present Illness    Avtar is here today for follow-up evaluation management of essential hypertension, dyspnea on exertion, and palpitations.  He had a Holter monitor ordered by primary care which showed normal sinus rhythm.  No significant arrhythmias noted.  Since his last office visit, he is feeling very well.  He denies chest pain, excessive shortness of breath, or any further palpitations.  He is compliant with medical therapy.    [facility]  Past Medical History:   Diagnosis Date   • Allergic rhinitis    • Asthma    • Depression 2018   • Foot pain    • GERD (gastroesophageal reflux disease)    • Hyperlipidemia 2017   • Hypertension    • Testosterone deficiency          SURGICALHX  Past Surgical History:   Procedure Laterality Date   • COLONOSCOPY N/A 2022    Procedure: COLONOSCOPY;  Surgeon: Lai Augustin MD;  Location: [facility] ENDOSCOPY;  Service: General;  Laterality: N/A;  normal colon        SOC  Social History     Socioeconomic History   • Marital status: Single   Tobacco Use   • Smoking status: Former     Packs/day: 0.50     Types: Cigarettes     Start date: 1990     Quit date: 2018     Years since quittin.4   • Smokeless tobacco: Never   • Tobacco comments:     quit smoking a month ago quit 2019   Vaping Use   • Vaping Use: Some days   • Substances: Nicotine, Flavoring   • Devices: Disposable   Substance and Sexual Activity   • Alcohol use: Not Currently     Comment: rarely   • Drug use: Never   • Sexual activity: Defer         FAMHX  Family History   Problem Relation Age of Onset   • No Known Problems Mother    • Cancer Father    • Lung cancer Father    • Colon cancer Other    • Malig Hyperthermia Neg Hx           ALLERGY  No Known Allergies     MEDSCURRENT    Current Outpatient Medications:   •  atenolol (TENORMIN) 25 MG  "tablet, Take 1 tablet by mouth 2 (Two) Times a Day., Disp: 180 tablet, Rfl: 1  •  HYDROcodone-acetaminophen (NORCO) 5-325 MG per tablet, Take 1 tablet by mouth Every 12 (Twelve) Hours As Needed., Disp: , Rfl:   •  lisinopril (PRINIVIL,ZESTRIL) 10 MG tablet, Take 1 tablet by mouth Daily., Disp: 90 tablet, Rfl: 1  •  meloxicam (MOBIC) 15 MG tablet, Take 1 tablet by mouth Daily As Needed., Disp: , Rfl:   •  Testosterone Cypionate (DEPOTESTOTERONE CYPIONATE) 200 MG/ML injection, Inject 1 mL into the appropriate muscle as directed by prescriber Every 28 (Twenty-Eight) Days., Disp: , Rfl:   •  polyethylene glycol (MIRALAX) 17 GM/SCOOP powder, polyethylene glycol 3350 17 gram/dose oral powder, Disp: , Rfl:   •  sulfamethoxazole-trimethoprim (BACTRIM DS,SEPTRA DS) 800-160 MG per tablet, sulfamethoxazole 800 mg-trimethoprim 160 mg tablet  TAKE 1 TABLET BY MOUTH EVERY DAY, Disp: , Rfl:       Review of Systems   Constitutional: Positive for weight gain. Negative for malaise/fatigue.   Cardiovascular: Negative for chest pain, dyspnea on exertion, irregular heartbeat, leg swelling, palpitations and syncope.   Respiratory: Negative for shortness of breath.    Neurological: Negative for dizziness.        Objective     /79   Pulse 65   Ht 175.3 cm (69.02\")   Wt 98.1 kg (216 lb 3.2 oz)   BMI 31.91 kg/m²       General Appearance:   · well developed  · well nourished  HENT:   · oropharynx moist  · lips not cyanotic  Neck:  · thyroid not enlarged  · supple  Respiratory:  · no respiratory distress  · normal breath sounds  · no rales  Cardiovascular:  · no jugular venous distention  · regular rhythm  · apical impulse normal  · S1 normal, S2 normal  · no S3, no S4   · no murmur  · no rub, no thrill  · carotid pulses normal; no bruit  · pedal pulses normal  · lower extremity edema: none    Musculoskeletal:  · no clubbing of fingers.   · normocephalic, head atraumatic  Skin:   · warm, dry  Psychiatric:  · judgement and insight " appropriate  · normal mood and affect      Result Review :         CMP        8/5/2022    15:15 1/20/2023    14:11   CMP   Glucose 79   93     BUN 10   10     Creatinine 1.08   1.34     EGFR 86.2   66.2     Sodium 143   136     Potassium 4.2   3.9     Chloride 104   97     Calcium 9.4   10.0     Total Protein 6.6   7.3     Albumin 4.10   4.6     Globulin 2.5   2.7     Total Bilirubin 0.4   0.3     Alkaline Phosphatase 43   50     AST (SGOT) 18   19     ALT (SGPT) 22   27     Albumin/Globulin Ratio 1.6   1.7     BUN/Creatinine Ratio 9.3   7.5     Anion Gap 12.7   6.8       CBC        8/5/2022    15:15 12/21/2022    12:23 1/20/2023    14:11   CBC   WBC 9.13   8.41   8.99     RBC 4.94   5.08   5.42     Hemoglobin 14.0   14.5   15.7     Hematocrit 43.5   44.5   46.9     MCV 88.1   87.6   86.5     MCH 28.3   28.5   29.0     MCHC 32.2   32.6   33.5     RDW 12.9   13.2   12.8     Platelets 311   360   335         Data reviewed: Cardiology studies Event monitor reviewed above.  Labs reviewed.     Procedures      Avtar Chapman  reports that he quit smoking about 5 years ago. His smoking use included cigarettes. He started smoking about 33 years ago. He smoked an average of .5 packs per day. He has never used smokeless tobacco.. I have educated him on the risk of diseases from using tobacco products such as cancer, COPD and heart disease.                Assessment and Plan        ASSESSMENT:  Encounter Diagnoses   Name Primary?   • Hypertension, essential Yes   • Dyspnea on exertion    • Palpitations          PLAN:    1.  Essential hypertension-controlled.  Continue current medical therapy.  Avtar reports that he has gained some weight recently he is going to work on weight reduction strategies.  Notify office if blood pressures consistently greater than 140/90.  2.  Dyspnea on exertion-resolved.  3.  Palpitations-resolved.  Unremarkable Holter monitor.    Avtar is agreeable to the plan of care he will follow-up  annually unless problems arise.      Patient was given instructions and counseling regarding his condition or for health maintenance advice. Please see specific information pulled into the AVS if appropriate.           Ruthie Jarvis, APRN   5/30/2023  14:04 EDT

## 2023-07-05 PROBLEM — Z20.2 STD EXPOSURE: Status: ACTIVE | Noted: 2023-07-05

## 2023-08-24 ENCOUNTER — OFFICE VISIT (OUTPATIENT)
Dept: FAMILY MEDICINE CLINIC | Facility: CLINIC | Age: 48
End: 2023-08-24
Payer: COMMERCIAL

## 2023-08-24 VITALS
WEIGHT: 216 LBS | HEIGHT: 69 IN | OXYGEN SATURATION: 98 % | DIASTOLIC BLOOD PRESSURE: 86 MMHG | BODY MASS INDEX: 31.99 KG/M2 | HEART RATE: 62 BPM | SYSTOLIC BLOOD PRESSURE: 130 MMHG | TEMPERATURE: 98.5 F

## 2023-08-24 DIAGNOSIS — Z13.29 SCREENING FOR THYROID DISORDER: ICD-10-CM

## 2023-08-24 DIAGNOSIS — E61.1 IRON DEFICIENCY: ICD-10-CM

## 2023-08-24 DIAGNOSIS — R00.2 PALPITATIONS: ICD-10-CM

## 2023-08-24 DIAGNOSIS — E78.2 MIXED HYPERLIPIDEMIA: ICD-10-CM

## 2023-08-24 DIAGNOSIS — I10 PRIMARY HYPERTENSION: Primary | ICD-10-CM

## 2023-08-24 DIAGNOSIS — R73.01 IMPAIRED FASTING GLUCOSE: ICD-10-CM

## 2023-08-24 DIAGNOSIS — R79.89 LOW TESTOSTERONE IN MALE: ICD-10-CM

## 2023-08-24 RX ORDER — LISINOPRIL 20 MG/1
20 TABLET ORAL DAILY
Qty: 90 TABLET | Refills: 1 | Status: SHIPPED | OUTPATIENT
Start: 2023-08-24

## 2023-08-24 RX ORDER — ATENOLOL 25 MG/1
25 TABLET ORAL 2 TIMES DAILY
Qty: 180 TABLET | Refills: 1 | Status: SHIPPED | OUTPATIENT
Start: 2023-08-24

## 2023-08-24 NOTE — PROGRESS NOTES
Follow Up Office Visit      Patient Name: Avtar Chapman  : 1975   MRN: [id number]     Chief Complaint:    Chief Complaint   Patient presents with    Allergic Rhinitis    Hyperlipidemia    Hypertension    Depression    Heartburn    impaired fasting glucose       History of Present Illness: Avtar Chapman is a 47 y.o. male who is here today to follow up for HTN, hyperlipidemia, depression, IFG, GERD, allergic rhinitis.  Review lab results    Labs- 2023  Psa- 2023  Colonoscopy- 2022      Dr tonia breen online provider currently prescribing testosterone, states his hgb and hct is high when checked by his provider that prescribes his testosterone   Patient reports he had phlebotomy ordered by the prescriber of his testosterone states his hemoglobin was 18    Subjective      Review of Systems:   Review of Systems   Constitutional:  Negative for fever.   HENT:  Negative for ear pain and sore throat.    Eyes:  Negative for visual disturbance.   Respiratory:  Negative for cough.    Cardiovascular:  Negative for chest pain.   Gastrointestinal:  Negative for abdominal pain, diarrhea, nausea and vomiting.   Genitourinary:  Negative for dysuria.   Neurological:  Negative for dizziness and headaches.      Past Medical History:   Past Medical History:   Diagnosis Date    Allergic rhinitis     Asthma     Depression 2018    Foot pain     GERD (gastroesophageal reflux disease)     Hyperlipidemia 2017    Hypertension     Testosterone deficiency        Past Surgical History:   Past Surgical History:   Procedure Laterality Date    COLONOSCOPY N/A 2022    Procedure: COLONOSCOPY;  Surgeon: Lai Augustin MD;  Location: [facility] ENDOSCOPY;  Service: General;  Laterality: N/A;  normal colon       Family History:   Family History   Problem Relation Age of Onset    No Known Problems Mother     Cancer Father     Lung cancer Father     Colon cancer Other     Malig Hyperthermia Neg Hx        Social  "History:   Social History     Socioeconomic History    Marital status: Single   Tobacco Use    Smoking status: Former     Packs/day: 0.50     Types: Cigarettes     Start date: 1990     Quit date: 2018     Years since quittin.6    Smokeless tobacco: Never    Tobacco comments:     quit smoking a month ago quit 2019   Vaping Use    Vaping Use: Some days    Substances: Nicotine, Flavoring    Devices: Disposable   Substance and Sexual Activity    Alcohol use: Not Currently     Comment: rarely    Drug use: Never    Sexual activity: Defer       Medications:     Current Outpatient Medications:     atenolol (TENORMIN) 25 MG tablet, Take 1 tablet by mouth 2 (Two) Times a Day., Disp: 180 tablet, Rfl: 1    HYDROcodone-acetaminophen (NORCO) 5-325 MG per tablet, Take 1 tablet by mouth Every 12 (Twelve) Hours As Needed., Disp: , Rfl:     lisinopril (PRINIVIL,ZESTRIL) 20 MG tablet, Take 1 tablet by mouth Daily., Disp: 90 tablet, Rfl: 1    meloxicam (MOBIC) 15 MG tablet, Take 1 tablet by mouth Daily As Needed., Disp: , Rfl:     polyethylene glycol (MIRALAX) 17 GM/SCOOP powder, polyethylene glycol 3350 17 gram/dose oral powder, Disp: , Rfl:     Testosterone Cypionate (DEPOTESTOTERONE CYPIONATE) 200 MG/ML injection, Inject 1 mL into the appropriate muscle as directed by prescriber Every 28 (Twenty-Eight) Days., Disp: , Rfl:     Allergies:   No Known Allergies            Objective     Physical Exam:  Vital Signs:   Vitals:    23 0714   BP: 130/86   Pulse: 62   Temp: 98.5 øF (36.9 øC)   SpO2: 98%   Weight: 98 kg (216 lb)   Height: 175.3 cm (69.02\")     Body mass index is 31.88 kg/mý.   BMI is >= 30 and <35. (Class 1 Obesity). The following options were offered after discussion;: exercise counseling/recommendations and nutrition counseling/recommendations       Physical Exam  HENT:      Right Ear: Tympanic membrane normal.      Left Ear: Tympanic membrane normal.      Nose: Nose normal.      Mouth/Throat:      Mouth: " Mucous membranes are moist.   Eyes:      Conjunctiva/sclera: Conjunctivae normal.   Neck:      Vascular: No carotid bruit.   Cardiovascular:      Rate and Rhythm: Normal rate and regular rhythm.      Heart sounds: Normal heart sounds. No murmur heard.  Pulmonary:      Effort: Pulmonary effort is normal.      Breath sounds: Normal breath sounds.   Abdominal:      General: Bowel sounds are normal.      Palpations: Abdomen is soft.   Musculoskeletal:      Right lower leg: No edema.      Left lower leg: No edema.   Skin:     General: Skin is warm and dry.   Neurological:      Mental Status: He is alert.   Psychiatric:         Mood and Affect: Mood normal.         Behavior: Behavior normal.         Assessment / Plan      Assessment/Plan:   Diagnoses and all orders for this visit:    1. Primary hypertension (Primary)    2. Mixed hyperlipidemia  -     Lipid Panel; Future    3. Impaired fasting glucose  -     Comprehensive Metabolic Panel; Future  -     Hemoglobin A1c; Future  -     Urinalysis With Culture If Indicated -; Future    4. Palpitations    5. Low testosterone in male  -     CBC Auto Differential; Future  -     Testosterone, Free, Total; Future  -     PSA DIAGNOSTIC; Future    6. Iron deficiency  -     CBC Auto Differential; Future  -     Iron Profile; Future  -     Ferritin; Future    7. Screening for thyroid disorder  -     TSH; Future    Other orders  -     atenolol (TENORMIN) 25 MG tablet; Take 1 tablet by mouth 2 (Two) Times a Day.  Dispense: 180 tablet; Refill: 1  -     lisinopril (PRINIVIL,ZESTRIL) 20 MG tablet; Take 1 tablet by mouth Daily.  Dispense: 90 tablet; Refill: 1         Hypertension currently controlled lisinopril 20 mg daily provide refills recommend patient exercise 30 minutes daily and weight loss  Hyperlipidemia LDL above goal recommend cutting out fried foods red meat pork products cheese increasing fruits vegetables whole grains exercise times daily again weight loss impaired fasting  "glucose recommend decrease carb intake added sugars increase water exercise 30 minutes daily weight loss  Palpitations currently controlled with atenolol  Low testosterone In male currently taking testosterone from another provider  Iron deficiency recommend increase iron rich foods in diet such as green leafy vegetables hemoglobin hematocrit are normal on the recent lab      Follow Up:   Return in about 5 months (around 1/24/2024).    Reinaldo Lezama, FARA    \"Please note that portions of this note were completed with a voice recognition program.\"    "

## 2023-09-27 ENCOUNTER — CLINICAL SUPPORT (OUTPATIENT)
Dept: FAMILY MEDICINE CLINIC | Facility: CLINIC | Age: 48
End: 2023-09-27
Payer: COMMERCIAL

## 2023-09-27 ENCOUNTER — TELEPHONE (OUTPATIENT)
Dept: FAMILY MEDICINE CLINIC | Facility: CLINIC | Age: 48
End: 2023-09-27

## 2023-09-27 VITALS — DIASTOLIC BLOOD PRESSURE: 100 MMHG | SYSTOLIC BLOOD PRESSURE: 140 MMHG

## 2023-09-27 DIAGNOSIS — I10 PRIMARY HYPERTENSION: Primary | ICD-10-CM

## 2023-09-27 RX ORDER — LISINOPRIL 40 MG/1
40 TABLET ORAL DAILY
Qty: 90 TABLET | Refills: 1 | Status: SHIPPED | OUTPATIENT
Start: 2023-09-27

## 2023-09-27 NOTE — PROGRESS NOTES
Patient came in today for a BP check, reading was 140/100, task sent to Teja.   Made 1 mon f/up for Bp medicine change.

## 2023-11-08 ENCOUNTER — OFFICE VISIT (OUTPATIENT)
Dept: FAMILY MEDICINE CLINIC | Facility: CLINIC | Age: 48
End: 2023-11-08
Payer: COMMERCIAL

## 2023-11-08 VITALS
TEMPERATURE: 97.2 F | HEIGHT: 69 IN | HEART RATE: 62 BPM | BODY MASS INDEX: 31.1 KG/M2 | SYSTOLIC BLOOD PRESSURE: 145 MMHG | DIASTOLIC BLOOD PRESSURE: 75 MMHG | WEIGHT: 210 LBS | OXYGEN SATURATION: 98 %

## 2023-11-08 DIAGNOSIS — Z72.0 NICOTINE USE: ICD-10-CM

## 2023-11-08 DIAGNOSIS — I10 PRIMARY HYPERTENSION: Primary | ICD-10-CM

## 2023-11-08 DIAGNOSIS — R00.2 PALPITATIONS: ICD-10-CM

## 2023-11-08 RX ORDER — HYDROCHLOROTHIAZIDE 12.5 MG/1
12.5 TABLET ORAL DAILY
Qty: 30 TABLET | Refills: 0 | Status: SHIPPED | OUTPATIENT
Start: 2023-11-08

## 2023-11-08 NOTE — PROGRESS NOTES
Follow Up Office Visit      Patient Name: Avtar Chapman  : 1975   MRN: [id number]     Chief Complaint:    Chief Complaint   Patient presents with    Hypertension       History of Present Illness: Avtar Chapman is a 47 y.o. male who is here today to follow up for HTN.    Lisinopril 40 mg, Atenolol 25mg    Labs-2023  Colonoscopy- 2022    Follow up increased lisinopril to 40 mg     Pt reports blood pressure at home 140's after using vape with nicotine   States he is vaping daily and notices palpitations sometimes after using vape      Pt reports exercising 3 days per week  Loss of 6 lbs in the past month     Subjective      Review of Systems:   Review of Systems   Constitutional:  Negative for fever.   Respiratory:  Negative for cough and shortness of breath.    Cardiovascular:  Negative for chest pain.   Genitourinary:  Negative for dysuria.   Neurological:  Negative for dizziness and headaches.        Past Medical History:   Past Medical History:   Diagnosis Date    Allergic rhinitis     Asthma     Depression 2018    Foot pain     GERD (gastroesophageal reflux disease)     Hyperlipidemia 2017    Hypertension     Testosterone deficiency        Past Surgical History:   Past Surgical History:   Procedure Laterality Date    COLONOSCOPY N/A 2022    Procedure: COLONOSCOPY;  Surgeon: Lai Augustin MD;  Location: [facility] ENDOSCOPY;  Service: General;  Laterality: N/A;  normal colon       Family History:   Family History   Problem Relation Age of Onset    No Known Problems Mother     Cancer Father     Lung cancer Father     Colon cancer Other     Malig Hyperthermia Neg Hx        Social History:   Social History     Socioeconomic History    Marital status: Single   Tobacco Use    Smoking status: Former     Packs/day: .5     Types: Cigarettes     Start date: 1990     Quit date: 2018     Years since quittin.8    Smokeless tobacco: Never    Tobacco comments:     quit  "smoking a month ago quit 2019   Vaping Use    Vaping Use: Some days    Substances: Nicotine, Flavoring    Devices: Disposable   Substance and Sexual Activity    Alcohol use: Not Currently     Comment: rarely    Drug use: Never    Sexual activity: Defer       Medications:     Current Outpatient Medications:     atenolol (TENORMIN) 25 MG tablet, Take 1 tablet by mouth 2 (Two) Times a Day., Disp: 180 tablet, Rfl: 1    HYDROcodone-acetaminophen (NORCO) 5-325 MG per tablet, Take 1 tablet by mouth Every 12 (Twelve) Hours As Needed., Disp: , Rfl:     lisinopril (PRINIVIL,ZESTRIL) 40 MG tablet, Take 1 tablet by mouth Daily., Disp: 90 tablet, Rfl: 1    meloxicam (MOBIC) 15 MG tablet, Take 1 tablet by mouth Daily As Needed., Disp: , Rfl:     polyethylene glycol (MIRALAX) 17 GM/SCOOP powder, polyethylene glycol 3350 17 gram/dose oral powder, Disp: , Rfl:     Testosterone Cypionate (DEPOTESTOTERONE CYPIONATE) 200 MG/ML injection, Inject 1 mL into the appropriate muscle as directed by prescriber Every 28 (Twenty-Eight) Days., Disp: , Rfl:     hydroCHLOROthiazide (HYDRODIURIL) 12.5 MG tablet, Take 1 tablet by mouth Daily., Disp: 30 tablet, Rfl: 0    Allergies:   No Known Allergies          Objective     Physical Exam:  Vital Signs:   Vitals:    11/08/23 0807 11/08/23 0818   BP: 145/93 145/75   Pulse: 62    Temp: 97.2 °F (36.2 °C)    SpO2: 98%    Weight: 95.3 kg (210 lb)    Height: 175.3 cm (69.02\")      Body mass index is 30.99 kg/m².           Physical Exam  Neck:      Vascular: No carotid bruit.   Cardiovascular:      Rate and Rhythm: Normal rate and regular rhythm.      Heart sounds: Normal heart sounds. No murmur heard.  Pulmonary:      Effort: Pulmonary effort is normal.      Breath sounds: Normal breath sounds.   Musculoskeletal:      Right lower leg: No edema.      Left lower leg: No edema.   Skin:     General: Skin is warm and dry.   Neurological:      Mental Status: He is alert.             Assessment / Plan  " "    Assessment/Plan:   Diagnoses and all orders for this visit:    1. Primary hypertension (Primary)  -     hydroCHLOROthiazide (HYDRODIURIL) 12.5 MG tablet; Take 1 tablet by mouth Daily.  Dispense: 30 tablet; Refill: 0    2. Nicotine use    3. Palpitations       Hypertension improved still remains uncontrolled we will continue lisinopril 40 mg daily and hydrochlorothiazide 12.5 mg once daily with a blood pressure check in 2 weeks  Nicotine use recommend discontinuation of vaping  Palpitations stable on atenolol do recommend discontinuing vaping for better control        Follow Up:   Return in about 3 months (around 2/8/2024).    Reinaldo Lezama, APRN    \"Please note that portions of this note were completed with a voice recognition program.\"    "

## 2023-11-17 ENCOUNTER — CLINICAL SUPPORT (OUTPATIENT)
Dept: FAMILY MEDICINE CLINIC | Facility: CLINIC | Age: 48
End: 2023-11-17
Payer: COMMERCIAL

## 2023-11-17 VITALS — DIASTOLIC BLOOD PRESSURE: 78 MMHG | SYSTOLIC BLOOD PRESSURE: 130 MMHG

## 2024-01-23 ENCOUNTER — CLINICAL SUPPORT (OUTPATIENT)
Dept: FAMILY MEDICINE CLINIC | Facility: CLINIC | Age: 49
End: 2024-01-23
Payer: COMMERCIAL

## 2024-01-23 VITALS — BODY MASS INDEX: 29.53 KG/M2 | DIASTOLIC BLOOD PRESSURE: 72 MMHG | SYSTOLIC BLOOD PRESSURE: 108 MMHG | WEIGHT: 200 LBS

## 2024-01-23 DIAGNOSIS — I10 PRIMARY HYPERTENSION: Primary | ICD-10-CM

## 2024-01-23 NOTE — PROGRESS NOTES
Patient came in for a blood pressure and it was 108/72.  Patient said he stopped taking his testosterone for 3 weeks. However he is going to take it at home today. Patient stated he quit vaping as well.

## 2024-02-23 RX ORDER — TRIAMCINOLONE ACETONIDE 1 MG/G
OINTMENT TOPICAL
COMMUNITY

## 2024-02-23 RX ORDER — CLOBETASOL PROPIONATE 0.5 MG/G
OINTMENT TOPICAL
COMMUNITY

## 2024-02-27 ENCOUNTER — OFFICE VISIT (OUTPATIENT)
Dept: FAMILY MEDICINE CLINIC | Facility: CLINIC | Age: 49
End: 2024-02-27
Payer: COMMERCIAL

## 2024-02-27 VITALS
HEIGHT: 69 IN | BODY MASS INDEX: 30.07 KG/M2 | DIASTOLIC BLOOD PRESSURE: 81 MMHG | WEIGHT: 203 LBS | TEMPERATURE: 97.9 F | SYSTOLIC BLOOD PRESSURE: 121 MMHG | OXYGEN SATURATION: 98 % | HEART RATE: 62 BPM

## 2024-02-27 DIAGNOSIS — R00.2 PALPITATIONS: ICD-10-CM

## 2024-02-27 DIAGNOSIS — E78.2 MIXED HYPERLIPIDEMIA: ICD-10-CM

## 2024-02-27 DIAGNOSIS — I10 PRIMARY HYPERTENSION: Primary | ICD-10-CM

## 2024-02-27 DIAGNOSIS — G89.4 CHRONIC PAIN DISORDER: ICD-10-CM

## 2024-02-27 DIAGNOSIS — R79.89 LOW TESTOSTERONE IN MALE: ICD-10-CM

## 2024-02-27 DIAGNOSIS — Z13.29 SCREENING FOR THYROID DISORDER: ICD-10-CM

## 2024-02-27 DIAGNOSIS — R73.01 IMPAIRED FASTING GLUCOSE: ICD-10-CM

## 2024-02-27 DIAGNOSIS — E61.1 IRON DEFICIENCY: ICD-10-CM

## 2024-02-27 LAB
ALBUMIN SERPL-MCNC: 4.2 G/DL (ref 3.5–5.2)
ALBUMIN/GLOB SERPL: 1.6 G/DL
ALP SERPL-CCNC: 47 U/L (ref 39–117)
ALT SERPL W P-5'-P-CCNC: 14 U/L (ref 1–41)
ANION GAP SERPL CALCULATED.3IONS-SCNC: 14 MMOL/L (ref 5–15)
AST SERPL-CCNC: 14 U/L (ref 1–40)
BASOPHILS # BLD AUTO: 0.08 10*3/MM3 (ref 0–0.2)
BASOPHILS NFR BLD AUTO: 0.9 % (ref 0–1.5)
BILIRUB SERPL-MCNC: 0.4 MG/DL (ref 0–1.2)
BILIRUB UR QL STRIP: NEGATIVE
BUN SERPL-MCNC: 9 MG/DL (ref 6–20)
BUN/CREAT SERPL: 6.1 (ref 7–25)
CALCIUM SPEC-SCNC: 9.5 MG/DL (ref 8.6–10.5)
CHLORIDE SERPL-SCNC: 103 MMOL/L (ref 98–107)
CHOLEST SERPL-MCNC: 199 MG/DL (ref 0–200)
CLARITY UR: CLEAR
CO2 SERPL-SCNC: 22 MMOL/L (ref 22–29)
COLOR UR: YELLOW
CREAT SERPL-MCNC: 1.48 MG/DL (ref 0.76–1.27)
DEPRECATED RDW RBC AUTO: 42.8 FL (ref 37–54)
EGFRCR SERPLBLD CKD-EPI 2021: 58.4 ML/MIN/1.73
EOSINOPHIL # BLD AUTO: 0.19 10*3/MM3 (ref 0–0.4)
EOSINOPHIL NFR BLD AUTO: 2.2 % (ref 0.3–6.2)
ERYTHROCYTE [DISTWIDTH] IN BLOOD BY AUTOMATED COUNT: 13.6 % (ref 12.3–15.4)
ESTRADIOL SERPL HS-MCNC: 31.5 PG/ML
FERRITIN SERPL-MCNC: 26.2 NG/ML (ref 30–400)
GLOBULIN UR ELPH-MCNC: 2.7 GM/DL
GLUCOSE SERPL-MCNC: 81 MG/DL (ref 65–99)
GLUCOSE UR STRIP-MCNC: NEGATIVE MG/DL
HBA1C MFR BLD: 5.4 % (ref 4.8–5.6)
HCT VFR BLD AUTO: 42.7 % (ref 37.5–51)
HDLC SERPL-MCNC: 25 MG/DL (ref 40–60)
HGB BLD-MCNC: 14.3 G/DL (ref 13–17.7)
HGB UR QL STRIP.AUTO: NEGATIVE
HOLD SPECIMEN: NORMAL
IMM GRANULOCYTES # BLD AUTO: 0.02 10*3/MM3 (ref 0–0.05)
IMM GRANULOCYTES NFR BLD AUTO: 0.2 % (ref 0–0.5)
IRON 24H UR-MRATE: 63 MCG/DL (ref 59–158)
IRON SATN MFR SERPL: 15 % (ref 20–50)
KETONES UR QL STRIP: NEGATIVE
LDLC SERPL CALC-MCNC: 134 MG/DL (ref 0–100)
LDLC/HDLC SERPL: 5.18 {RATIO}
LEUKOCYTE ESTERASE UR QL STRIP.AUTO: NEGATIVE
LYMPHOCYTES # BLD AUTO: 3.11 10*3/MM3 (ref 0.7–3.1)
LYMPHOCYTES NFR BLD AUTO: 36.1 % (ref 19.6–45.3)
MCH RBC QN AUTO: 29.2 PG (ref 26.6–33)
MCHC RBC AUTO-ENTMCNC: 33.5 G/DL (ref 31.5–35.7)
MCV RBC AUTO: 87.3 FL (ref 79–97)
MONOCYTES # BLD AUTO: 0.75 10*3/MM3 (ref 0.1–0.9)
MONOCYTES NFR BLD AUTO: 8.7 % (ref 5–12)
NEUTROPHILS NFR BLD AUTO: 4.47 10*3/MM3 (ref 1.7–7)
NEUTROPHILS NFR BLD AUTO: 51.9 % (ref 42.7–76)
NITRITE UR QL STRIP: NEGATIVE
NRBC BLD AUTO-RTO: 0 /100 WBC (ref 0–0.2)
PH UR STRIP.AUTO: 6.5 [PH] (ref 5–8)
PLATELET # BLD AUTO: 261 10*3/MM3 (ref 140–450)
PMV BLD AUTO: 10.1 FL (ref 6–12)
POTASSIUM SERPL-SCNC: 3.8 MMOL/L (ref 3.5–5.2)
PROT SERPL-MCNC: 6.9 G/DL (ref 6–8.5)
PROT UR QL STRIP: NEGATIVE
PSA SERPL-MCNC: 1.18 NG/ML (ref 0–4)
RBC # BLD AUTO: 4.89 10*6/MM3 (ref 4.14–5.8)
SODIUM SERPL-SCNC: 139 MMOL/L (ref 136–145)
SP GR UR STRIP: 1.01 (ref 1–1.03)
TIBC SERPL-MCNC: 425 MCG/DL (ref 298–536)
TRANSFERRIN SERPL-MCNC: 285 MG/DL (ref 200–360)
TRIGL SERPL-MCNC: 223 MG/DL (ref 0–150)
TSH SERPL DL<=0.05 MIU/L-ACNC: 1.47 UIU/ML (ref 0.27–4.2)
UROBILINOGEN UR QL STRIP: NORMAL
VLDLC SERPL-MCNC: 40 MG/DL (ref 5–40)
WBC NRBC COR # BLD AUTO: 8.62 10*3/MM3 (ref 3.4–10.8)

## 2024-02-27 PROCEDURE — 84153 ASSAY OF PSA TOTAL: CPT | Performed by: NURSE PRACTITIONER

## 2024-02-27 PROCEDURE — 80053 COMPREHEN METABOLIC PANEL: CPT | Performed by: NURSE PRACTITIONER

## 2024-02-27 PROCEDURE — 85025 COMPLETE CBC W/AUTO DIFF WBC: CPT | Performed by: NURSE PRACTITIONER

## 2024-02-27 PROCEDURE — 84402 ASSAY OF FREE TESTOSTERONE: CPT | Performed by: NURSE PRACTITIONER

## 2024-02-27 PROCEDURE — 80061 LIPID PANEL: CPT | Performed by: NURSE PRACTITIONER

## 2024-02-27 PROCEDURE — 83036 HEMOGLOBIN GLYCOSYLATED A1C: CPT | Performed by: NURSE PRACTITIONER

## 2024-02-27 PROCEDURE — 83540 ASSAY OF IRON: CPT | Performed by: NURSE PRACTITIONER

## 2024-02-27 PROCEDURE — 84443 ASSAY THYROID STIM HORMONE: CPT | Performed by: NURSE PRACTITIONER

## 2024-02-27 PROCEDURE — 84403 ASSAY OF TOTAL TESTOSTERONE: CPT | Performed by: NURSE PRACTITIONER

## 2024-02-27 PROCEDURE — 82670 ASSAY OF TOTAL ESTRADIOL: CPT | Performed by: NURSE PRACTITIONER

## 2024-02-27 PROCEDURE — 84466 ASSAY OF TRANSFERRIN: CPT | Performed by: NURSE PRACTITIONER

## 2024-02-27 PROCEDURE — 82728 ASSAY OF FERRITIN: CPT | Performed by: NURSE PRACTITIONER

## 2024-02-27 PROCEDURE — 81003 URINALYSIS AUTO W/O SCOPE: CPT | Performed by: NURSE PRACTITIONER

## 2024-02-27 RX ORDER — ATENOLOL 25 MG/1
25 TABLET ORAL 2 TIMES DAILY
Qty: 180 TABLET | Refills: 1 | Status: SHIPPED | OUTPATIENT
Start: 2024-02-27

## 2024-02-27 RX ORDER — LISINOPRIL 40 MG/1
40 TABLET ORAL DAILY
Qty: 90 TABLET | Refills: 1 | Status: SHIPPED | OUTPATIENT
Start: 2024-02-27

## 2024-02-27 NOTE — PROGRESS NOTES
Follow Up Office Visit      Patient Name: Avtar Chapman  : 1975   MRN: [id number]     Chief Complaint:    Chief Complaint   Patient presents with    Depression    Heartburn    Hyperlipidemia    Hypertension    Anemia    impaired fasting glucose       History of Present Illness: Avtar Chapman is a 48 y.o. male who is here today to follow up for HTN, depression, hyperlipidemia, anemia, IFG, GERD.     Follow up Lisinopril 40 mg, Atenolol 25mg,   Stopped HCTZ 12.5mg felt exercise helped his blood pressure     Labs-2023  Psa- 2023  Colonoscopy- 2022    Exercising 3 x per week    Currently receiving testosterone therapy per     RACHEL WALTON II          Subjective      Review of Systems:   Review of Systems   Constitutional:  Negative for fever.   HENT:  Negative for ear pain and sore throat.    Eyes:  Negative for visual disturbance.   Respiratory:  Negative for cough and shortness of breath.    Cardiovascular:  Negative for chest pain and palpitations.   Gastrointestinal:  Negative for constipation, diarrhea, nausea and vomiting.   Genitourinary:  Negative for dysuria.        Past Medical History:   Past Medical History:   Diagnosis Date    Allergic rhinitis     Asthma     Depression 2018    Foot pain     GERD (gastroesophageal reflux disease)     Hyperlipidemia 2017    Hypertension     Testosterone deficiency        Past Surgical History:   Past Surgical History:   Procedure Laterality Date    COLONOSCOPY N/A 2022    Procedure: COLONOSCOPY;  Surgeon: Lai Augustin MD;  Location: [facility] ENDOSCOPY;  Service: General;  Laterality: N/A;  normal colon       Family History:   Family History   Problem Relation Age of Onset    No Known Problems Mother     Cancer Father     Lung cancer Father     Colon cancer Other     Malig Hyperthermia Neg Hx        Social History:   Social History     Socioeconomic History    Marital status: Single   Tobacco Use    Smoking status: Former      "Packs/day: .5     Types: Cigarettes     Start date: 1990     Quit date: 2018     Years since quittin.1     Passive exposure: Never    Smokeless tobacco: Never    Tobacco comments:     quit smoking a month ago quit 2019   Vaping Use    Vaping Use: Some days    Substances: Nicotine, Flavoring    Devices: Disposable   Substance and Sexual Activity    Alcohol use: Not Currently     Comment: rarely    Drug use: Never    Sexual activity: Defer       Medications:     Current Outpatient Medications:     atenolol (TENORMIN) 25 MG tablet, Take 1 tablet by mouth 2 (Two) Times a Day., Disp: 180 tablet, Rfl: 1    clobetasol (TEMOVATE) 0.05 % ointment, APPLY A SMALL AMOUNT TWICE DAILY TO ACTIVE LESIONS, Disp: , Rfl:     HYDROcodone-acetaminophen (NORCO) 5-325 MG per tablet, Take 1 tablet by mouth Every 12 (Twelve) Hours As Needed., Disp: , Rfl:     lisinopril (PRINIVIL,ZESTRIL) 40 MG tablet, Take 1 tablet by mouth Daily., Disp: 90 tablet, Rfl: 1    meloxicam (MOBIC) 15 MG tablet, Take 1 tablet by mouth Daily As Needed., Disp: , Rfl:     polyethylene glycol (MIRALAX) 17 GM/SCOOP powder, polyethylene glycol 3350 17 gram/dose oral powder, Disp: , Rfl:     Testosterone Cypionate (DEPOTESTOTERONE CYPIONATE) 200 MG/ML injection, Inject 1 mL into the appropriate muscle as directed by prescriber Every 28 (Twenty-Eight) Days., Disp: , Rfl:     triamcinolone (KENALOG) 0.1 % ointment, APPLY 1 APPLICATION TOPICALLY TO THE APPROPRIATE AREA AS DIRECTED 2 (TWO) TIMES A DAY FOR 14 DAYS., Disp: , Rfl:     Allergies:   No Known Allergies        PHQ-2 Total Score: 0   PHQ-9 Total Score: 0     Objective     Physical Exam:  Vital Signs:   Vitals:    24 0711   BP: 121/81   Pulse: 62   Temp: 97.9 °F (36.6 °C)   SpO2: 98%   Weight: 92.1 kg (203 lb)   Height: 175.3 cm (69\")     Body mass index is 29.98 kg/m².           Physical Exam  HENT:      Right Ear: Tympanic membrane normal.      Nose: Nose normal.      Mouth/Throat:      Mouth: " "Mucous membranes are moist.   Eyes:      Conjunctiva/sclera: Conjunctivae normal.   Neck:      Vascular: No carotid bruit.   Cardiovascular:      Rate and Rhythm: Normal rate and regular rhythm.      Heart sounds: Normal heart sounds. No murmur heard.  Pulmonary:      Effort: Pulmonary effort is normal.      Breath sounds: Normal breath sounds.   Abdominal:      General: Bowel sounds are normal.      Palpations: Abdomen is soft.   Musculoskeletal:      Right lower leg: No edema.      Left lower leg: No edema.   Skin:     General: Skin is warm and dry.   Neurological:      Mental Status: He is alert.   Psychiatric:         Mood and Affect: Mood normal.         Behavior: Behavior normal.             Assessment / Plan      Assessment/Plan:   Diagnoses and all orders for this visit:    1. Primary hypertension (Primary)    2. Mixed hyperlipidemia    3. Impaired fasting glucose    4. Palpitations    5. Low testosterone in male  -     Estradiol    6. Chronic pain disorder    Other orders  -     atenolol (TENORMIN) 25 MG tablet; Take 1 tablet by mouth 2 (Two) Times a Day.  Dispense: 180 tablet; Refill: 1  -     lisinopril (PRINIVIL,ZESTRIL) 40 MG tablet; Take 1 tablet by mouth Daily.  Dispense: 90 tablet; Refill: 1         Hypertension currently: Lisinopril, stopped hctz  recommend continue to monitor and exercise increasing to 30 minutes daily  Palpitations atenolol will provide refills  Hyperlipidemia will obtain lipid panel to monitor recommend reducing fried foods red meat pork cheese increasing fruits/whole grains exercise 30 minutes daily and weight loss  Low testosterone male treated by a provider online will obtain labs to monitor  Chronic pain disorder currently managed by [facility] prescribing hydrocodone      Follow Up:   Return in about 6 months (around 8/27/2024).    FARA Clark    \"Please note that portions of this note were completed with a voice recognition program.\"    "

## 2024-02-29 DIAGNOSIS — E78.2 MIXED HYPERLIPIDEMIA: Primary | ICD-10-CM

## 2024-02-29 RX ORDER — ATORVASTATIN CALCIUM 20 MG/1
20 TABLET, FILM COATED ORAL
Qty: 90 TABLET | Refills: 1 | Status: SHIPPED | OUTPATIENT
Start: 2024-02-29

## 2024-03-03 LAB
TESTOST FREE SERPL-MCNC: >50 PG/ML (ref 6.8–21.5)
TESTOST SERPL-MCNC: >1500 NG/DL (ref 264–916)

## 2024-04-08 ENCOUNTER — LAB (OUTPATIENT)
Dept: LAB | Facility: HOSPITAL | Age: 49
End: 2024-04-08
Payer: COMMERCIAL

## 2024-04-08 DIAGNOSIS — E78.2 MIXED HYPERLIPIDEMIA: ICD-10-CM

## 2024-04-08 LAB
ALBUMIN SERPL-MCNC: 4.2 G/DL (ref 3.5–5.2)
ALBUMIN/GLOB SERPL: 1.6 G/DL
ALP SERPL-CCNC: 49 U/L (ref 39–117)
ALT SERPL W P-5'-P-CCNC: 21 U/L (ref 1–41)
ANION GAP SERPL CALCULATED.3IONS-SCNC: 10.8 MMOL/L (ref 5–15)
AST SERPL-CCNC: 21 U/L (ref 1–40)
BILIRUB SERPL-MCNC: 0.3 MG/DL (ref 0–1.2)
BUN SERPL-MCNC: 9 MG/DL (ref 6–20)
BUN/CREAT SERPL: 7.5 (ref 7–25)
CALCIUM SPEC-SCNC: 9.8 MG/DL (ref 8.6–10.5)
CHLORIDE SERPL-SCNC: 107 MMOL/L (ref 98–107)
CHOLEST SERPL-MCNC: 217 MG/DL (ref 0–200)
CO2 SERPL-SCNC: 25.2 MMOL/L (ref 22–29)
CREAT SERPL-MCNC: 1.2 MG/DL (ref 0.76–1.27)
EGFRCR SERPLBLD CKD-EPI 2021: 75.1 ML/MIN/1.73
GLOBULIN UR ELPH-MCNC: 2.6 GM/DL
GLUCOSE SERPL-MCNC: 86 MG/DL (ref 65–99)
HDLC SERPL-MCNC: 24 MG/DL (ref 40–60)
LDLC SERPL CALC-MCNC: 159 MG/DL (ref 0–100)
LDLC/HDLC SERPL: 6.51 {RATIO}
POTASSIUM SERPL-SCNC: 4.4 MMOL/L (ref 3.5–5.2)
PROT SERPL-MCNC: 6.8 G/DL (ref 6–8.5)
SODIUM SERPL-SCNC: 143 MMOL/L (ref 136–145)
TRIGL SERPL-MCNC: 184 MG/DL (ref 0–150)
VLDLC SERPL-MCNC: 34 MG/DL (ref 5–40)

## 2024-04-08 PROCEDURE — 80053 COMPREHEN METABOLIC PANEL: CPT

## 2024-04-08 PROCEDURE — 80061 LIPID PANEL: CPT

## 2024-04-09 DIAGNOSIS — E78.2 MIXED HYPERLIPIDEMIA: Primary | ICD-10-CM

## 2024-04-09 RX ORDER — ATORVASTATIN CALCIUM 40 MG/1
40 TABLET, FILM COATED ORAL NIGHTLY
Qty: 90 TABLET | Refills: 1 | Status: SHIPPED | OUTPATIENT
Start: 2024-04-09

## 2024-06-04 ENCOUNTER — OFFICE VISIT (OUTPATIENT)
Dept: CARDIOLOGY | Facility: CLINIC | Age: 49
End: 2024-06-04
Payer: COMMERCIAL

## 2024-06-04 VITALS
WEIGHT: 186.2 LBS | HEIGHT: 69 IN | OXYGEN SATURATION: 98 % | BODY MASS INDEX: 27.58 KG/M2 | DIASTOLIC BLOOD PRESSURE: 89 MMHG | HEART RATE: 61 BPM | SYSTOLIC BLOOD PRESSURE: 136 MMHG

## 2024-06-04 DIAGNOSIS — E78.2 MIXED HYPERLIPIDEMIA: ICD-10-CM

## 2024-06-04 DIAGNOSIS — I10 ESSENTIAL HYPERTENSION: Primary | ICD-10-CM

## 2024-06-04 PROCEDURE — 99214 OFFICE O/P EST MOD 30 MIN: CPT | Performed by: NURSE PRACTITIONER

## 2024-06-04 PROCEDURE — 3075F SYST BP GE 130 - 139MM HG: CPT | Performed by: NURSE PRACTITIONER

## 2024-06-04 PROCEDURE — 1160F RVW MEDS BY RX/DR IN RCRD: CPT | Performed by: NURSE PRACTITIONER

## 2024-06-04 PROCEDURE — 1159F MED LIST DOCD IN RCRD: CPT | Performed by: NURSE PRACTITIONER

## 2024-06-04 PROCEDURE — 3079F DIAST BP 80-89 MM HG: CPT | Performed by: NURSE PRACTITIONER

## 2024-06-04 NOTE — PROGRESS NOTES
Chief Complaint  Hypertension and Follow-up (1 year -wants to know if he can control cholesterol without meds.)    Subjective            Avtar Chapman presents to [facility]  History of Present Illness    Mr. Chapman is here for follow-up evaluation management of essential hypertension, mixed hyperlipidemia.  Since his last visit he has noticed his blood pressure is a little higher than usual.  He denies chest pain, excessive shortness of breath, or palpitations.  His labs recently showed elevated cholesterol numbers, he was started on atorvastatin but has not yet started due to fear of side effects.    PMH  Past Medical History:   Diagnosis Date    Allergic rhinitis     Asthma     Depression 2018    Foot pain     GERD (gastroesophageal reflux disease)     Hyperlipidemia 2017    Hypertension     Testosterone deficiency          SURGICALHX  Past Surgical History:   Procedure Laterality Date    COLONOSCOPY N/A 2022    Procedure: COLONOSCOPY;  Surgeon: Lai Augustin MD;  Location: [facility] ENDOSCOPY;  Service: General;  Laterality: N/A;  normal colon        SOC  Social History     Socioeconomic History    Marital status: Single   Tobacco Use    Smoking status: Former     Current packs/day: 0.00     Average packs/day: 0.5 packs/day for 28.0 years (14.0 ttl pk-yrs)     Types: Cigarettes     Start date: 1990     Quit date: 2018     Years since quittin.4     Passive exposure: Never    Smokeless tobacco: Never    Tobacco comments:     quit smoking a month ago quit 2019   Vaping Use    Vaping status: Some Days    Substances: Nicotine, Flavoring    Devices: Disposable   Substance and Sexual Activity    Alcohol use: Not Currently     Comment: rarely    Drug use: Never    Sexual activity: Defer         FAMHX  Family History   Problem Relation Age of Onset    No Known Problems Mother     Cancer Father     Lung cancer Father     Colon cancer Other     Malig  "Hyperthermia Neg Hx           ALLERGY  No Known Allergies     MEDSCURRENT    Current Outpatient Medications:     atenolol (TENORMIN) 25 MG tablet, Take 1 tablet by mouth 2 (Two) Times a Day., Disp: 180 tablet, Rfl: 1    clobetasol (TEMOVATE) 0.05 % ointment, APPLY A SMALL AMOUNT TWICE DAILY TO ACTIVE LESIONS, Disp: , Rfl:     HYDROcodone-acetaminophen (NORCO) 5-325 MG per tablet, Take 1 tablet by mouth Every 12 (Twelve) Hours As Needed., Disp: , Rfl:     lisinopril (PRINIVIL,ZESTRIL) 40 MG tablet, Take 1 tablet by mouth Daily., Disp: 90 tablet, Rfl: 1    meloxicam (MOBIC) 15 MG tablet, Take 1 tablet by mouth Daily As Needed., Disp: , Rfl:     polyethylene glycol (MIRALAX) 17 GM/SCOOP powder, polyethylene glycol 3350 17 gram/dose oral powder, Disp: , Rfl:     Testosterone Cypionate (DEPOTESTOTERONE CYPIONATE) 200 MG/ML injection, Inject 1 mL into the appropriate muscle as directed by prescriber Every 28 (Twenty-Eight) Days., Disp: , Rfl:     triamcinolone (KENALOG) 0.1 % ointment, APPLY 1 APPLICATION TOPICALLY TO THE APPROPRIATE AREA AS DIRECTED 2 (TWO) TIMES A DAY FOR 14 DAYS., Disp: , Rfl:     atorvastatin (LIPITOR) 40 MG tablet, Take 1 tablet by mouth Every Night. (Patient not taking: Reported on 6/4/2024), Disp: 90 tablet, Rfl: 1      Review of Systems   Cardiovascular:  Negative for chest pain, dyspnea on exertion, irregular heartbeat and palpitations.        Objective     /89   Pulse 61   Ht 175.3 cm (69\")   Wt 84.5 kg (186 lb 3.2 oz)   SpO2 98%   BMI 27.50 kg/m²       General Appearance:   well developed  well nourished  HENT:   oropharynx moist  lips not cyanotic  Neck:  thyroid not enlarged  supple  Respiratory:  no respiratory distress  normal breath sounds  no rales  Cardiovascular:  no jugular venous distention  regular rhythm  apical impulse normal  S1 normal, S2 normal  no S3, no S4   no murmur  no rub, no thrill  carotid pulses normal; no bruit  pedal pulses normal  lower extremity edema: " none    Musculoskeletal:  no clubbing of fingers.   normocephalic, head atraumatic  Skin:   warm, dry  Psychiatric:  judgement and insight appropriate  normal mood and affect      Result Review :     The following data was reviewed by: FAAR Bal on 06/04/2024:    CMP          7/5/2023    11:32 2/27/2024    07:33 4/8/2024    12:47   CMP   Glucose 93  81  86    BUN 9  9  9    Creatinine 1.11  1.48  1.20    EGFR 82.9  58.4  75.1    Sodium 139  139  143    Potassium 4.4  3.8  4.4    Chloride 103  103  107    Calcium 10.2  9.5  9.8    Total Protein 7.6  6.9  6.8    Albumin 4.8  4.2  4.2    Globulin 2.8  2.7  2.6    Total Bilirubin 0.6  0.4  0.3    Alkaline Phosphatase 46  47  49    AST (SGOT) 15  14  21    ALT (SGPT) 27  14  21    Albumin/Globulin Ratio 1.7  1.6  1.6    BUN/Creatinine Ratio 8.1  6.1  7.5    Anion Gap 8.7  14.0  10.8      CBC          7/5/2023    11:32 2/27/2024    07:33   CBC   WBC 8.77  8.62    RBC 5.55  4.89    Hemoglobin 16.4  14.3    Hematocrit 48.3  42.7    MCV 87.0  87.3    MCH 29.5  29.2    MCHC 34.0  33.5    RDW 13.0  13.6    Platelets 326  261      Lipid Panel          7/5/2023    11:32 2/27/2024    07:33 4/8/2024    12:47   Lipid Panel   Total Cholesterol 190  199  217    Triglycerides 215  223  184    HDL Cholesterol 29  25  24    VLDL Cholesterol 38  40  34    LDL Cholesterol  123  134  159    LDL/HDL Ratio 4.07  5.18  6.51      TSH          7/5/2023    11:32 2/27/2024    07:33   TSH   TSH 0.955  1.470        Data reviewed : Primary care note reviewed      Procedures      Avtar Chapman  reports that he quit smoking about 6 years ago. His smoking use included cigarettes. He started smoking about 34 years ago. He has a 14 pack-year smoking history. He has never been exposed to tobacco smoke. He has never used smokeless tobacco. I have educated him on the risk of diseases from using tobacco products such as cancer, COPD, and heart disease.              Assessment and Plan         ASSESSMENT:  Encounter Diagnoses   Name Primary?    Essential hypertension Yes    Mixed hyperlipidemia          PLAN:    1.  Essential hypertension-controlled.  He has noticed readings are trending up.  If they become elevated greater than 140/90 he will notify the office and we can make adjustments to medical therapy.  2.  Mixed hyperlipidemia-lipids are not at goal.  We discussed statin therapy and possible side effects today, he is willing to start the medication and will let me know if any of the side effects occur.  3.  Follow-up annually.          Patient was given instructions and counseling regarding his condition or for health maintenance advice. Please see specific information pulled into the AVS if appropriate.           Ruthie Jarvis, APRN   6/4/2024  13:43 EDT

## 2024-07-26 RX ORDER — ATENOLOL 25 MG/1
25 TABLET ORAL 2 TIMES DAILY
Qty: 180 TABLET | Refills: 1 | Status: SHIPPED | OUTPATIENT
Start: 2024-07-26

## 2024-08-23 NOTE — PROGRESS NOTES
Follow Up Office Visit      Patient Name: Avtar Chapman  : 1975   MRN: [id number]     Chief Complaint:    Chief Complaint   Patient presents with    Depression    Heartburn    Hypertension    Hyperlipidemia    Allergic Rhinitis    impaired fasting glucose    Anemia       History of Present Illness: Avtar Chapman is a 48 y.o. male who is here today to follow up for HTN, depression, hyperlipidemia, anemia, IFG, GERD.     Labs-2024  Psa- 2024  Colonoscopy- 2022    Pt reports exerising 2 x per week at the gym    C/o he did not start lipitor until one month prior and is taking 20 mg nightly     Testosterone replacement therapy prescribed by RACHEL WALTON II     Pt reports monitoring blood pressure at home 150/115-60-90    Subjective      Review of Systems:   Review of Systems   Constitutional:  Negative for fever.   HENT:  Negative for ear pain and sore throat.    Eyes:  Negative for visual disturbance.   Respiratory:  Negative for cough and shortness of breath.    Cardiovascular:  Negative for chest pain.   Gastrointestinal:  Negative for abdominal pain, constipation, diarrhea, nausea and vomiting.   Genitourinary:  Negative for dysuria.   Musculoskeletal:  Negative for myalgias.   Neurological:  Negative for dizziness and headaches.        Past Medical History:   Past Medical History:   Diagnosis Date    Allergic rhinitis     Asthma     Depression 2018    Foot pain     GERD (gastroesophageal reflux disease)     Hyperlipidemia 2017    Hypertension     Testosterone deficiency        Past Surgical History:   Past Surgical History:   Procedure Laterality Date    COLONOSCOPY N/A 2022    Procedure: COLONOSCOPY;  Surgeon: Lai Augustin MD;  Location: [facility]COPY;  Service: General;  Laterality: N/A;  normal colon       Family History:   Family History   Problem Relation Age of Onset    No Known Problems Mother     Cancer Father     Lung cancer Father     Colon cancer Other      Malig Hyperthermia Neg Hx        Social History:   Social History     Socioeconomic History    Marital status: Single   Tobacco Use    Smoking status: Former     Current packs/day: 0.00     Average packs/day: 0.5 packs/day for 28.0 years (14.0 ttl pk-yrs)     Types: Cigarettes     Start date: 1990     Quit date: 2018     Years since quittin.6     Passive exposure: Never    Smokeless tobacco: Never    Tobacco comments:     quit smoking a month ago quit 2019   Vaping Use    Vaping status: Some Days    Substances: Nicotine, Flavoring    Devices: Disposable   Substance and Sexual Activity    Alcohol use: Not Currently     Comment: rarely    Drug use: Never    Sexual activity: Defer       Medications:     Current Outpatient Medications:     atenolol (TENORMIN) 25 MG tablet, Take 1 tablet by mouth 2 (Two) Times a Day., Disp: 180 tablet, Rfl: 1    atorvastatin (LIPITOR) 40 MG tablet, Take 1 tablet by mouth Every Night., Disp: 90 tablet, Rfl: 1    clobetasol (TEMOVATE) 0.05 % ointment, APPLY A SMALL AMOUNT TWICE DAILY TO ACTIVE LESIONS, Disp: , Rfl:     HYDROcodone-acetaminophen (NORCO) 5-325 MG per tablet, Take 1 tablet by mouth Every 12 (Twelve) Hours As Needed., Disp: , Rfl:     lisinopril (PRINIVIL,ZESTRIL) 40 MG tablet, Take 1 tablet by mouth Daily., Disp: 90 tablet, Rfl: 1    polyethylene glycol (MIRALAX) 17 GM/SCOOP powder, polyethylene glycol 3350 17 gram/dose oral powder, Disp: , Rfl:     Testosterone Cypionate (DEPOTESTOTERONE CYPIONATE) 200 MG/ML injection, Inject 1 mL into the appropriate muscle as directed by prescriber Every 28 (Twenty-Eight) Days., Disp: , Rfl:     triamcinolone (KENALOG) 0.1 % ointment, APPLY 1 APPLICATION TOPICALLY TO THE APPROPRIATE AREA AS DIRECTED 2 (TWO) TIMES A DAY FOR 14 DAYS., Disp: , Rfl:     Allergies:   No Known Allergies          Objective     Physical Exam:  Vital Signs:   Vitals:    24 0707 24 0734   BP: 156/93 138/92   Pulse: 53    Temp: 97.2 °F  "(36.2 °C)    SpO2: 100%    Weight: 89.8 kg (198 lb)    Height: 175.3 cm (69\")      Body mass index is 29.24 kg/m².   BMI is >= 25 and <30. (Overweight) The following options were offered after discussion;: exercise counseling/recommendations and nutrition counseling/recommendations       Physical Exam  HENT:      Right Ear: Tympanic membrane normal.      Left Ear: Tympanic membrane normal.      Nose: Nose normal.      Mouth/Throat:      Mouth: Mucous membranes are moist.   Eyes:      Conjunctiva/sclera: Conjunctivae normal.   Neck:      Vascular: No carotid bruit.   Cardiovascular:      Rate and Rhythm: Normal rate and regular rhythm.      Heart sounds: Normal heart sounds. No murmur heard.  Pulmonary:      Effort: Pulmonary effort is normal.      Breath sounds: Normal breath sounds.   Abdominal:      General: Bowel sounds are normal.      Palpations: Abdomen is soft.   Musculoskeletal:      Right lower leg: No edema.      Left lower leg: No edema.   Skin:     General: Skin is warm and dry.   Neurological:      Mental Status: He is alert.   Psychiatric:         Mood and Affect: Mood normal.         Behavior: Behavior normal.         Assessment / Plan      Assessment/Plan:   Diagnoses and all orders for this visit:    1. Primary hypertension (Primary)  -     Microalbumin / Creatinine Urine Ratio - Urine, Clean Catch    2. Mixed hyperlipidemia  -     Lipid Panel  -     Comprehensive Metabolic Panel    3. Impaired fasting glucose  -     Microalbumin / Creatinine Urine Ratio - Urine, Clean Catch  -     Hemoglobin A1c  -     Urinalysis With Culture If Indicated -    4. Palpitations    5. Low testosterone in male  -     Testosterone, Free, Total  -     Estradiol    6. Iron deficiency  -     CBC Auto Differential  -     Iron Profile  -     Ferritin    7. Screening for thyroid disorder  -     TSH    Other orders  -     atenolol (TENORMIN) 25 MG tablet; Take 1 tablet by mouth 2 (Two) Times a Day.  Dispense: 180 tablet; " "Refill: 1  -     lisinopril (PRINIVIL,ZESTRIL) 40 MG tablet; Take 1 tablet by mouth Daily.  Dispense: 90 tablet; Refill: 1  -     atorvastatin (LIPITOR) 40 MG tablet; Take 1 tablet by mouth Every Night.  Dispense: 90 tablet; Refill: 1         Hypertension elevated in clinic recommend to bring home monitor home readings and for blood pressure check will continue lisinopril and atenolol at this time if remains elevated will discuss starting amlodipine at nighttime or hydrochlorothiazide in the morning do recommend decrease salt intake increase water intake exercise 30 minutes daily weight loss  Hyperlipidemia obtain lipid panel and CMP to monitor current statin dose Lipitor 40 mg at nighttime denies myalgias  Impaired fasting glucose obtain hemoglobin A1c to monitor recommend reduce added carbs sugars exercise 30 minutes daily and weight loss  Palpitations currently controlled with atenolol  Low testosterone in male will assess labs online provider is currently prescribing testosterone replacement therapy  Iron deficiency will obtain labs to monitor no current supplementation at this time      Follow Up:   Return in about 6 months (around 2/27/2025).    Reinaldo Lezama, FARA    \"Please note that portions of this note were completed with a voice recognition program.\"    "

## 2024-08-27 ENCOUNTER — OFFICE VISIT (OUTPATIENT)
Dept: FAMILY MEDICINE CLINIC | Facility: CLINIC | Age: 49
End: 2024-08-27
Payer: COMMERCIAL

## 2024-08-27 VITALS
WEIGHT: 198 LBS | DIASTOLIC BLOOD PRESSURE: 92 MMHG | HEIGHT: 69 IN | OXYGEN SATURATION: 100 % | HEART RATE: 53 BPM | BODY MASS INDEX: 29.33 KG/M2 | SYSTOLIC BLOOD PRESSURE: 138 MMHG | TEMPERATURE: 97.2 F

## 2024-08-27 DIAGNOSIS — I10 PRIMARY HYPERTENSION: Primary | ICD-10-CM

## 2024-08-27 DIAGNOSIS — R79.89 LOW TESTOSTERONE IN MALE: ICD-10-CM

## 2024-08-27 DIAGNOSIS — R00.2 PALPITATIONS: ICD-10-CM

## 2024-08-27 DIAGNOSIS — Z13.29 SCREENING FOR THYROID DISORDER: ICD-10-CM

## 2024-08-27 DIAGNOSIS — R73.01 IMPAIRED FASTING GLUCOSE: ICD-10-CM

## 2024-08-27 DIAGNOSIS — E78.2 MIXED HYPERLIPIDEMIA: ICD-10-CM

## 2024-08-27 DIAGNOSIS — E61.1 IRON DEFICIENCY: ICD-10-CM

## 2024-08-27 LAB
ALBUMIN SERPL-MCNC: 4.4 G/DL (ref 3.5–5.2)
ALBUMIN/GLOB SERPL: 1.6 G/DL
ALP SERPL-CCNC: 56 U/L (ref 39–117)
ALT SERPL W P-5'-P-CCNC: 18 U/L (ref 1–41)
ANION GAP SERPL CALCULATED.3IONS-SCNC: 11.1 MMOL/L (ref 5–15)
AST SERPL-CCNC: 20 U/L (ref 1–40)
BASOPHILS # BLD AUTO: 0.08 10*3/MM3 (ref 0–0.2)
BASOPHILS NFR BLD AUTO: 0.8 % (ref 0–1.5)
BILIRUB SERPL-MCNC: 0.4 MG/DL (ref 0–1.2)
BUN SERPL-MCNC: 8 MG/DL (ref 6–20)
BUN/CREAT SERPL: 6.3 (ref 7–25)
CALCIUM SPEC-SCNC: 9.7 MG/DL (ref 8.6–10.5)
CHLORIDE SERPL-SCNC: 105 MMOL/L (ref 98–107)
CHOLEST SERPL-MCNC: 137 MG/DL (ref 0–200)
CO2 SERPL-SCNC: 23.9 MMOL/L (ref 22–29)
CREAT SERPL-MCNC: 1.26 MG/DL (ref 0.76–1.27)
DEPRECATED RDW RBC AUTO: 42 FL (ref 37–54)
EGFRCR SERPLBLD CKD-EPI 2021: 70.8 ML/MIN/1.73
EOSINOPHIL # BLD AUTO: 0.23 10*3/MM3 (ref 0–0.4)
EOSINOPHIL NFR BLD AUTO: 2.3 % (ref 0.3–6.2)
ERYTHROCYTE [DISTWIDTH] IN BLOOD BY AUTOMATED COUNT: 12.9 % (ref 12.3–15.4)
ESTRADIOL SERPL HS-MCNC: 36.1 PG/ML
FERRITIN SERPL-MCNC: 41.5 NG/ML (ref 30–400)
GLOBULIN UR ELPH-MCNC: 2.7 GM/DL
GLUCOSE SERPL-MCNC: 83 MG/DL (ref 65–99)
HBA1C MFR BLD: 5.2 % (ref 4.8–5.6)
HCT VFR BLD AUTO: 46.8 % (ref 37.5–51)
HDLC SERPL-MCNC: 26 MG/DL (ref 40–60)
HGB BLD-MCNC: 15.2 G/DL (ref 13–17.7)
IMM GRANULOCYTES # BLD AUTO: 0.02 10*3/MM3 (ref 0–0.05)
IMM GRANULOCYTES NFR BLD AUTO: 0.2 % (ref 0–0.5)
IRON 24H UR-MRATE: 94 MCG/DL (ref 59–158)
IRON SATN MFR SERPL: 21 % (ref 20–50)
LDLC SERPL CALC-MCNC: 89 MG/DL (ref 0–100)
LDLC/HDLC SERPL: 3.35 {RATIO}
LYMPHOCYTES # BLD AUTO: 2.58 10*3/MM3 (ref 0.7–3.1)
LYMPHOCYTES NFR BLD AUTO: 25.7 % (ref 19.6–45.3)
MCH RBC QN AUTO: 29.1 PG (ref 26.6–33)
MCHC RBC AUTO-ENTMCNC: 32.5 G/DL (ref 31.5–35.7)
MCV RBC AUTO: 89.5 FL (ref 79–97)
MONOCYTES # BLD AUTO: 0.7 10*3/MM3 (ref 0.1–0.9)
MONOCYTES NFR BLD AUTO: 7 % (ref 5–12)
NEUTROPHILS NFR BLD AUTO: 6.41 10*3/MM3 (ref 1.7–7)
NEUTROPHILS NFR BLD AUTO: 64 % (ref 42.7–76)
NRBC BLD AUTO-RTO: 0 /100 WBC (ref 0–0.2)
PLATELET # BLD AUTO: 275 10*3/MM3 (ref 140–450)
PMV BLD AUTO: 10.2 FL (ref 6–12)
POTASSIUM SERPL-SCNC: 4.1 MMOL/L (ref 3.5–5.2)
PROT SERPL-MCNC: 7.1 G/DL (ref 6–8.5)
RBC # BLD AUTO: 5.23 10*6/MM3 (ref 4.14–5.8)
SODIUM SERPL-SCNC: 140 MMOL/L (ref 136–145)
TIBC SERPL-MCNC: 443 MCG/DL (ref 298–536)
TRANSFERRIN SERPL-MCNC: 297 MG/DL (ref 200–360)
TRIGL SERPL-MCNC: 119 MG/DL (ref 0–150)
TSH SERPL DL<=0.05 MIU/L-ACNC: 1.38 UIU/ML (ref 0.27–4.2)
VLDLC SERPL-MCNC: 22 MG/DL (ref 5–40)
WBC NRBC COR # BLD AUTO: 10.02 10*3/MM3 (ref 3.4–10.8)

## 2024-08-27 PROCEDURE — 84466 ASSAY OF TRANSFERRIN: CPT | Performed by: NURSE PRACTITIONER

## 2024-08-27 PROCEDURE — 80053 COMPREHEN METABOLIC PANEL: CPT | Performed by: NURSE PRACTITIONER

## 2024-08-27 PROCEDURE — 83540 ASSAY OF IRON: CPT | Performed by: NURSE PRACTITIONER

## 2024-08-27 PROCEDURE — 3080F DIAST BP >= 90 MM HG: CPT | Performed by: NURSE PRACTITIONER

## 2024-08-27 PROCEDURE — 82728 ASSAY OF FERRITIN: CPT | Performed by: NURSE PRACTITIONER

## 2024-08-27 PROCEDURE — 82670 ASSAY OF TOTAL ESTRADIOL: CPT | Performed by: NURSE PRACTITIONER

## 2024-08-27 PROCEDURE — 83036 HEMOGLOBIN GLYCOSYLATED A1C: CPT | Performed by: NURSE PRACTITIONER

## 2024-08-27 PROCEDURE — 80061 LIPID PANEL: CPT | Performed by: NURSE PRACTITIONER

## 2024-08-27 PROCEDURE — 85025 COMPLETE CBC W/AUTO DIFF WBC: CPT | Performed by: NURSE PRACTITIONER

## 2024-08-27 PROCEDURE — 3075F SYST BP GE 130 - 139MM HG: CPT | Performed by: NURSE PRACTITIONER

## 2024-08-27 PROCEDURE — 1125F AMNT PAIN NOTED PAIN PRSNT: CPT | Performed by: NURSE PRACTITIONER

## 2024-08-27 PROCEDURE — 99214 OFFICE O/P EST MOD 30 MIN: CPT | Performed by: NURSE PRACTITIONER

## 2024-08-27 PROCEDURE — 84402 ASSAY OF FREE TESTOSTERONE: CPT | Performed by: NURSE PRACTITIONER

## 2024-08-27 PROCEDURE — 84403 ASSAY OF TOTAL TESTOSTERONE: CPT | Performed by: NURSE PRACTITIONER

## 2024-08-27 PROCEDURE — 84443 ASSAY THYROID STIM HORMONE: CPT | Performed by: NURSE PRACTITIONER

## 2024-08-27 RX ORDER — LISINOPRIL 40 MG/1
40 TABLET ORAL DAILY
Qty: 90 TABLET | Refills: 1 | Status: SHIPPED | OUTPATIENT
Start: 2024-08-27

## 2024-08-27 RX ORDER — ATORVASTATIN CALCIUM 40 MG/1
40 TABLET, FILM COATED ORAL NIGHTLY
Qty: 90 TABLET | Refills: 1 | Status: SHIPPED | OUTPATIENT
Start: 2024-08-27

## 2024-08-27 RX ORDER — ATENOLOL 25 MG/1
25 TABLET ORAL 2 TIMES DAILY
Qty: 180 TABLET | Refills: 1 | Status: SHIPPED | OUTPATIENT
Start: 2024-08-27

## 2024-08-31 LAB
TESTOST FREE SERPL-MCNC: 45.8 PG/ML (ref 6.8–21.5)
TESTOST SERPL-MCNC: >1500 NG/DL (ref 264–916)

## 2024-09-26 ENCOUNTER — TELEPHONE (OUTPATIENT)
Dept: FAMILY MEDICINE CLINIC | Facility: CLINIC | Age: 49
End: 2024-09-26

## 2024-09-26 NOTE — TELEPHONE ENCOUNTER
Caller: Avtar Chapman    Relationship to patient: Self    Best call back number: [phone]     PATIENT WANTED TO KNOW IF HE IS NEEDING TO COMPLETE ANY LAB WORK.

## 2024-10-07 NOTE — PROGRESS NOTES
ACUTE VISIT     Patient Name: Avtar Chapman  : 1975   MRN: [id number]     Chief Complaint:    Chief Complaint   Patient presents with    Arm Pain       History of Present Illness: Avatr Chapman is a 48 y.o. male who is here today for RT elbow pain  With radiation arm pain x 1 month.    Denies numbness, tingling, fall, injury.  He states it hurts from his forearm up to his elbow.  Working out at the gym 2x per week arm workout had to stop due to pain       Also states he stopped lipitor due to muscle pain, wanted to see what his level are without medication he felt that this medication is what caused his elbow pain but after a month of not taking it elbow pain persists    Patient reports he has been monitoring his blood pressure at home and also obtaining elevated readings 140s to 150s similar to what we obtained today reports compliance with lisinopril and atenolol    Subjective      Review of Systems:   Review of Systems   Constitutional:  Negative for fever.   Respiratory:  Negative for cough and shortness of breath.    Cardiovascular:  Negative for chest pain, palpitations and leg swelling.   Musculoskeletal:  Positive for arthralgias. Negative for joint swelling.   Neurological:  Negative for dizziness and headaches.        Past Medical History:   Past Medical History:   Diagnosis Date    Allergic rhinitis     Asthma     Depression 2018    Foot pain     GERD (gastroesophageal reflux disease)     Hyperlipidemia 2017    Hypertension     Testosterone deficiency        Past Surgical History:   Past Surgical History:   Procedure Laterality Date    COLONOSCOPY N/A 2022    Procedure: COLONOSCOPY;  Surgeon: Lai Augustin MD;  Location: [facility] ENDOSCOPY;  Service: General;  Laterality: N/A;  normal colon       Family History:   Family History   Problem Relation Age of Onset    No Known Problems Mother     Cancer Father     Lung cancer Father     Colon cancer Other     [facility]  Hyperthermia Neg Hx        Social History:   Social History     Socioeconomic History    Marital status: Single   Tobacco Use    Smoking status: Former     Current packs/day: 0.00     Average packs/day: 0.5 packs/day for 28.0 years (14.0 ttl pk-yrs)     Types: Cigarettes     Start date: 1990     Quit date: 2018     Years since quittin.7     Passive exposure: Never    Smokeless tobacco: Never    Tobacco comments:     quit smoking a month ago quit 2019   Vaping Use    Vaping status: Some Days    Substances: Nicotine, Flavoring    Devices: Disposable   Substance and Sexual Activity    Alcohol use: Not Currently     Comment: rarely    Drug use: Never    Sexual activity: Defer       Medications:     Current Outpatient Medications:     atenolol (TENORMIN) 25 MG tablet, Take 1 tablet by mouth 2 (Two) Times a Day., Disp: 180 tablet, Rfl: 1    atorvastatin (LIPITOR) 40 MG tablet, Take 1 tablet by mouth Every Night., Disp: 90 tablet, Rfl: 1    clobetasol (TEMOVATE) 0.05 % ointment, APPLY A SMALL AMOUNT TWICE DAILY TO ACTIVE LESIONS, Disp: , Rfl:     HYDROcodone-acetaminophen (NORCO) 5-325 MG per tablet, Take 1 tablet by mouth Every 12 (Twelve) Hours As Needed., Disp: , Rfl:     lisinopril (PRINIVIL,ZESTRIL) 40 MG tablet, Take 1 tablet by mouth Daily., Disp: 90 tablet, Rfl: 1    polyethylene glycol (MIRALAX) 17 GM/SCOOP powder, polyethylene glycol 3350 17 gram/dose oral powder, Disp: , Rfl:     Testosterone Cypionate (DEPOTESTOTERONE CYPIONATE) 200 MG/ML injection, Inject 1 mL into the appropriate muscle as directed by prescriber Every 28 (Twenty-Eight) Days., Disp: , Rfl:     triamcinolone (KENALOG) 0.1 % ointment, APPLY 1 APPLICATION TOPICALLY TO THE APPROPRIATE AREA AS DIRECTED 2 (TWO) TIMES A DAY FOR 14 DAYS., Disp: , Rfl:     Diclofenac Sodium (VOLTAREN) 1 % gel gel, Apply 4 g topically to the appropriate area as directed 4 (Four) Times a Day As Needed (joint pain)., Disp: 350 g, Rfl: 1    hydroCHLOROthiazide  "12.5 MG tablet, Take 1 tablet by mouth Daily., Disp: 90 tablet, Rfl: 1    methylPREDNISolone (MEDROL) 4 MG dose pack, Take as directed on package instructions., Disp: 1 each, Rfl: 0    Allergies:   No Known Allergies      Objective     Physical Exam:  Vital Signs:   Vitals:    10/09/24 1410 10/09/24 1434   BP: 150/88 134/78   Pulse: 58    Temp: 98.5 °F (36.9 °C)    SpO2: 99%    Weight: 88.9 kg (196 lb)    Height: 175.3 cm (69\")      Body mass index is 28.94 kg/m².     Physical Exam  Eyes:      Conjunctiva/sclera: Conjunctivae normal.   Neck:      Vascular: No carotid bruit.   Cardiovascular:      Rate and Rhythm: Normal rate and regular rhythm.      Heart sounds: Normal heart sounds. No murmur heard.  Pulmonary:      Effort: Pulmonary effort is normal.      Breath sounds: Normal breath sounds.   Musculoskeletal:      Right lower leg: No edema.      Left lower leg: No edema.      Comments: Right lateral epicondyle tender to palpation   Skin:     General: Skin is warm and dry.   Neurological:      Mental Status: He is alert.   Psychiatric:         Mood and Affect: Mood normal.         Behavior: Behavior normal.           Assessment / Plan      Assessment/Plan:   Diagnoses and all orders for this visit:    1. Lateral epicondylitis of right elbow  -     Ambulatory Referral to Physical Therapy for Evaluation & Treatment    2. Right elbow pain  -     Ambulatory Referral to Physical Therapy for Evaluation & Treatment    3. Primary hypertension    4. Mixed hyperlipidemia  -     Lipid Panel  -     Comprehensive Metabolic Panel    5. Encounter for immunization  -     Fluzone >6mos ([id number])    Other orders  -     Diclofenac Sodium (VOLTAREN) 1 % gel gel; Apply 4 g topically to the appropriate area as directed 4 (Four) Times a Day As Needed (joint pain).  Dispense: 350 g; Refill: 1  -     methylPREDNISolone (MEDROL) 4 MG dose pack; Take as directed on package instructions.  Dispense: 1 each; Refill: 0  -     " hydroCHLOROthiazide 12.5 MG tablet; Take 1 tablet by mouth Daily.  Dispense: 90 tablet; Refill: 1       Lateral epicondylitis will refer to physical therapy start Voltaren gel and treat with a Medrol Dosepak  Hypertension uncontrolled start hydrochlorothiazide continue lisinopril and atenolol  Hyperlipidemia will obtain lipid panel to monitor as patient has chosen to discontinue his Lipitor we will call with results and further recommendations    Follow Up:   Return in about 4 weeks (around 11/6/2024).    FARA Clark      Please note that portions of this note were completed with a voice recognition program.

## 2024-10-09 ENCOUNTER — OFFICE VISIT (OUTPATIENT)
Dept: FAMILY MEDICINE CLINIC | Facility: CLINIC | Age: 49
End: 2024-10-09
Payer: COMMERCIAL

## 2024-10-09 VITALS
HEART RATE: 58 BPM | SYSTOLIC BLOOD PRESSURE: 134 MMHG | OXYGEN SATURATION: 99 % | WEIGHT: 196 LBS | DIASTOLIC BLOOD PRESSURE: 78 MMHG | TEMPERATURE: 98.5 F | HEIGHT: 69 IN | BODY MASS INDEX: 29.03 KG/M2

## 2024-10-09 DIAGNOSIS — Z23 ENCOUNTER FOR IMMUNIZATION: ICD-10-CM

## 2024-10-09 DIAGNOSIS — I10 PRIMARY HYPERTENSION: ICD-10-CM

## 2024-10-09 DIAGNOSIS — M77.11 LATERAL EPICONDYLITIS OF RIGHT ELBOW: ICD-10-CM

## 2024-10-09 DIAGNOSIS — E78.2 MIXED HYPERLIPIDEMIA: ICD-10-CM

## 2024-10-09 DIAGNOSIS — M25.521 RIGHT ELBOW PAIN: ICD-10-CM

## 2024-10-09 LAB
ALBUMIN SERPL-MCNC: 4.2 G/DL (ref 3.5–5.2)
ALBUMIN UR-MCNC: <1.2 MG/DL
ALBUMIN/GLOB SERPL: 1.6 G/DL
ALP SERPL-CCNC: 56 U/L (ref 39–117)
ALT SERPL W P-5'-P-CCNC: 15 U/L (ref 1–41)
ANION GAP SERPL CALCULATED.3IONS-SCNC: 10 MMOL/L (ref 5–15)
AST SERPL-CCNC: 13 U/L (ref 1–40)
BILIRUB SERPL-MCNC: 0.7 MG/DL (ref 0–1.2)
BILIRUB UR QL STRIP: NEGATIVE
BUN SERPL-MCNC: 9 MG/DL (ref 6–20)
BUN/CREAT SERPL: 6.1 (ref 7–25)
CALCIUM SPEC-SCNC: 9.8 MG/DL (ref 8.6–10.5)
CHLORIDE SERPL-SCNC: 101 MMOL/L (ref 98–107)
CHOLEST SERPL-MCNC: 209 MG/DL (ref 0–200)
CLARITY UR: CLEAR
CO2 SERPL-SCNC: 25 MMOL/L (ref 22–29)
COLOR UR: YELLOW
CREAT SERPL-MCNC: 1.48 MG/DL (ref 0.76–1.27)
CREAT UR-MCNC: 110.1 MG/DL
EGFRCR SERPLBLD CKD-EPI 2021: 58.4 ML/MIN/1.73
GLOBULIN UR ELPH-MCNC: 2.6 GM/DL
GLUCOSE SERPL-MCNC: 86 MG/DL (ref 65–99)
GLUCOSE UR STRIP-MCNC: NEGATIVE MG/DL
HDLC SERPL-MCNC: 28 MG/DL (ref 40–60)
HGB UR QL STRIP.AUTO: NEGATIVE
HOLD SPECIMEN: NORMAL
KETONES UR QL STRIP: NEGATIVE
LDLC SERPL CALC-MCNC: 160 MG/DL (ref 0–100)
LDLC/HDLC SERPL: 5.64 {RATIO}
LEUKOCYTE ESTERASE UR QL STRIP.AUTO: NEGATIVE
MICROALBUMIN/CREAT UR: NORMAL MG/G{CREAT}
NITRITE UR QL STRIP: NEGATIVE
PH UR STRIP.AUTO: 6.5 [PH] (ref 5–8)
POTASSIUM SERPL-SCNC: 3.9 MMOL/L (ref 3.5–5.2)
PROT SERPL-MCNC: 6.8 G/DL (ref 6–8.5)
PROT UR QL STRIP: NEGATIVE
SODIUM SERPL-SCNC: 136 MMOL/L (ref 136–145)
SP GR UR STRIP: 1.01 (ref 1–1.03)
TRIGL SERPL-MCNC: 116 MG/DL (ref 0–150)
UROBILINOGEN UR QL STRIP: NORMAL
VLDLC SERPL-MCNC: 21 MG/DL (ref 5–40)

## 2024-10-09 PROCEDURE — 99214 OFFICE O/P EST MOD 30 MIN: CPT | Performed by: NURSE PRACTITIONER

## 2024-10-09 PROCEDURE — 3078F DIAST BP <80 MM HG: CPT | Performed by: NURSE PRACTITIONER

## 2024-10-09 PROCEDURE — 80053 COMPREHEN METABOLIC PANEL: CPT | Performed by: NURSE PRACTITIONER

## 2024-10-09 PROCEDURE — 82043 UR ALBUMIN QUANTITATIVE: CPT | Performed by: NURSE PRACTITIONER

## 2024-10-09 PROCEDURE — 90471 IMMUNIZATION ADMIN: CPT | Performed by: NURSE PRACTITIONER

## 2024-10-09 PROCEDURE — 81003 URINALYSIS AUTO W/O SCOPE: CPT | Performed by: NURSE PRACTITIONER

## 2024-10-09 PROCEDURE — 90656 IIV3 VACC NO PRSV 0.5 ML IM: CPT | Performed by: NURSE PRACTITIONER

## 2024-10-09 PROCEDURE — 1125F AMNT PAIN NOTED PAIN PRSNT: CPT | Performed by: NURSE PRACTITIONER

## 2024-10-09 PROCEDURE — 82570 ASSAY OF URINE CREATININE: CPT | Performed by: NURSE PRACTITIONER

## 2024-10-09 PROCEDURE — 80061 LIPID PANEL: CPT | Performed by: NURSE PRACTITIONER

## 2024-10-09 PROCEDURE — 3075F SYST BP GE 130 - 139MM HG: CPT | Performed by: NURSE PRACTITIONER

## 2024-10-09 RX ORDER — HYDROCHLOROTHIAZIDE 12.5 MG/1
12.5 TABLET ORAL DAILY
Qty: 90 TABLET | Refills: 1 | Status: SHIPPED | OUTPATIENT
Start: 2024-10-09

## 2024-10-09 RX ORDER — METHYLPREDNISOLONE 4 MG
TABLET, DOSE PACK ORAL
Qty: 1 EACH | Refills: 0 | Status: SHIPPED | OUTPATIENT
Start: 2024-10-09

## 2024-10-10 ENCOUNTER — TELEPHONE (OUTPATIENT)
Dept: FAMILY MEDICINE CLINIC | Facility: CLINIC | Age: 49
End: 2024-10-10
Payer: COMMERCIAL

## 2024-10-10 DIAGNOSIS — E78.2 MIXED HYPERLIPIDEMIA: Primary | ICD-10-CM

## 2024-10-10 NOTE — TELEPHONE ENCOUNTER
"Relay     \"Per Estevan LDL cholesterol extremely high the highest its ever been recommend resuming statin as directed and recommended also by cardiology.Rechecking CMP and lipid panel in 30 days   Also increasing water intake  \"                "

## 2024-10-11 NOTE — TELEPHONE ENCOUNTER
Caller: Avtar Chapman    Relationship: Self    Best call back number: [phone]    Which medication are you concerned about: methylPREDNISolone (MEDROL) 4 MG dose pack     Who prescribed you this medication: Estevan Lezama APRN       What are your concerns: PATIENT STATED LAST TIME HE TOOK THIS MEDICATION HE HAD A BAD REACTION AND MEDICATION WAS PRESCRIBED BY HIS FOOT DOCTOR. PATIENT IS REQUESTING ANOTHER MEDICATION TO BE SENT TO HIS PHARMACY    .   [id number] - [place], [place] - [address] AT [facility] - [phone]  - [phone]  [phone]

## 2024-10-14 ENCOUNTER — TELEPHONE (OUTPATIENT)
Dept: FAMILY MEDICINE CLINIC | Facility: CLINIC | Age: 49
End: 2024-10-14
Payer: COMMERCIAL

## 2024-10-14 NOTE — TELEPHONE ENCOUNTER
Pt called back gave him his results ans then told him about the mobic     but the pt states he already takes mobic 15mg as needed from the pain clinic    what does he need to do and take  please advise

## 2024-10-15 DIAGNOSIS — M25.521 RIGHT ELBOW PAIN: Primary | ICD-10-CM

## 2025-01-01 ENCOUNTER — APPOINTMENT (OUTPATIENT)
Dept: GENERAL RADIOLOGY | Facility: HOSPITAL | Age: 50
End: 2025-01-01
Payer: COMMERCIAL

## 2025-01-01 ENCOUNTER — HOSPITAL ENCOUNTER (EMERGENCY)
Facility: HOSPITAL | Age: 50
Discharge: HOME | End: 2025-01-01
Attending: EMERGENCY MEDICINE
Payer: COMMERCIAL

## 2025-01-01 VITALS
DIASTOLIC BLOOD PRESSURE: 92 MMHG | TEMPERATURE: 97.9 F | HEART RATE: 52 BPM | HEIGHT: 69 IN | OXYGEN SATURATION: 99 % | SYSTOLIC BLOOD PRESSURE: 155 MMHG | RESPIRATION RATE: 18 BRPM | WEIGHT: 216.71 LBS | BODY MASS INDEX: 32.1 KG/M2

## 2025-01-01 DIAGNOSIS — R07.9 CHEST PAIN, UNSPECIFIED TYPE: Primary | ICD-10-CM

## 2025-01-01 DIAGNOSIS — I10 HYPERTENSION, UNSPECIFIED TYPE: ICD-10-CM

## 2025-01-01 LAB
ALBUMIN SERPL-MCNC: 4.2 G/DL (ref 3.5–5.2)
ALBUMIN/GLOB SERPL: 1.6 G/DL
ALP SERPL-CCNC: 56 U/L (ref 39–117)
ALT SERPL W P-5'-P-CCNC: 21 U/L (ref 1–41)
ANION GAP SERPL CALCULATED.3IONS-SCNC: 11.4 MMOL/L (ref 5–15)
AST SERPL-CCNC: 20 U/L (ref 1–40)
BASOPHILS # BLD AUTO: 0.08 10*3/MM3 (ref 0–0.2)
BASOPHILS NFR BLD AUTO: 1 % (ref 0–1.5)
BILIRUB SERPL-MCNC: 0.4 MG/DL (ref 0–1.2)
BUN SERPL-MCNC: 11 MG/DL (ref 6–20)
BUN/CREAT SERPL: 7.6 (ref 7–25)
CALCIUM SPEC-SCNC: 9.2 MG/DL (ref 8.6–10.5)
CHLORIDE SERPL-SCNC: 105 MMOL/L (ref 98–107)
CO2 SERPL-SCNC: 24.6 MMOL/L (ref 22–29)
CREAT SERPL-MCNC: 1.45 MG/DL (ref 0.76–1.27)
DEPRECATED RDW RBC AUTO: 42.3 FL (ref 37–54)
EGFRCR SERPLBLD CKD-EPI 2021: 59.4 ML/MIN/1.73
EOSINOPHIL # BLD AUTO: 0.17 10*3/MM3 (ref 0–0.4)
EOSINOPHIL NFR BLD AUTO: 2.1 % (ref 0.3–6.2)
ERYTHROCYTE [DISTWIDTH] IN BLOOD BY AUTOMATED COUNT: 13.2 % (ref 12.3–15.4)
GEN 5 1HR TROPONIN T REFLEX: 9 NG/L
GLOBULIN UR ELPH-MCNC: 2.7 GM/DL
GLUCOSE SERPL-MCNC: 103 MG/DL (ref 65–99)
HCT VFR BLD AUTO: 40 % (ref 37.5–51)
HGB BLD-MCNC: 13 G/DL (ref 13–17.7)
HOLD SPECIMEN: NORMAL
HOLD SPECIMEN: NORMAL
IMM GRANULOCYTES # BLD AUTO: 0.03 10*3/MM3 (ref 0–0.05)
IMM GRANULOCYTES NFR BLD AUTO: 0.4 % (ref 0–0.5)
LIPASE SERPL-CCNC: 41 U/L (ref 13–60)
LYMPHOCYTES # BLD AUTO: 2.73 10*3/MM3 (ref 0.7–3.1)
LYMPHOCYTES NFR BLD AUTO: 34.1 % (ref 19.6–45.3)
MAGNESIUM SERPL-MCNC: 2 MG/DL (ref 1.6–2.6)
MCH RBC QN AUTO: 28.4 PG (ref 26.6–33)
MCHC RBC AUTO-ENTMCNC: 32.5 G/DL (ref 31.5–35.7)
MCV RBC AUTO: 87.5 FL (ref 79–97)
MONOCYTES # BLD AUTO: 0.73 10*3/MM3 (ref 0.1–0.9)
MONOCYTES NFR BLD AUTO: 9.1 % (ref 5–12)
NEUTROPHILS NFR BLD AUTO: 4.27 10*3/MM3 (ref 1.7–7)
NEUTROPHILS NFR BLD AUTO: 53.3 % (ref 42.7–76)
NRBC BLD AUTO-RTO: 0 /100 WBC (ref 0–0.2)
NT-PROBNP SERPL-MCNC: 152 PG/ML (ref 0–450)
PLATELET # BLD AUTO: 236 10*3/MM3 (ref 140–450)
PMV BLD AUTO: 9.9 FL (ref 6–12)
POTASSIUM SERPL-SCNC: 3.7 MMOL/L (ref 3.5–5.2)
PROT SERPL-MCNC: 6.9 G/DL (ref 6–8.5)
QT INTERVAL: 370 MS
QTC INTERVAL: 377 MS
RBC # BLD AUTO: 4.57 10*6/MM3 (ref 4.14–5.8)
SODIUM SERPL-SCNC: 141 MMOL/L (ref 136–145)
TROPONIN T NUMERIC DELTA: -1 NG/L
TROPONIN T SERPL HS-MCNC: 10 NG/L
WBC NRBC COR # BLD AUTO: 8.01 10*3/MM3 (ref 3.4–10.8)
WHOLE BLOOD HOLD COAG: NORMAL
WHOLE BLOOD HOLD SPECIMEN: NORMAL

## 2025-01-01 PROCEDURE — 84484 ASSAY OF TROPONIN QUANT: CPT | Performed by: EMERGENCY MEDICINE

## 2025-01-01 PROCEDURE — 96374 THER/PROPH/DIAG INJ IV PUSH: CPT

## 2025-01-01 PROCEDURE — 36415 COLL VENOUS BLD VENIPUNCTURE: CPT

## 2025-01-01 PROCEDURE — 93005 ELECTROCARDIOGRAM TRACING: CPT

## 2025-01-01 PROCEDURE — 80053 COMPREHEN METABOLIC PANEL: CPT

## 2025-01-01 PROCEDURE — 96375 TX/PRO/DX INJ NEW DRUG ADDON: CPT

## 2025-01-01 PROCEDURE — 25010000002 ONDANSETRON PER 1 MG: Performed by: EMERGENCY MEDICINE

## 2025-01-01 PROCEDURE — 83735 ASSAY OF MAGNESIUM: CPT

## 2025-01-01 PROCEDURE — 84484 ASSAY OF TROPONIN QUANT: CPT

## 2025-01-01 PROCEDURE — 99284 EMERGENCY DEPT VISIT MOD MDM: CPT

## 2025-01-01 PROCEDURE — 25010000002 KETOROLAC TROMETHAMINE PER 15 MG: Performed by: EMERGENCY MEDICINE

## 2025-01-01 PROCEDURE — 85025 COMPLETE CBC W/AUTO DIFF WBC: CPT

## 2025-01-01 PROCEDURE — 93005 ELECTROCARDIOGRAM TRACING: CPT | Performed by: EMERGENCY MEDICINE

## 2025-01-01 PROCEDURE — 83690 ASSAY OF LIPASE: CPT

## 2025-01-01 PROCEDURE — 71045 X-RAY EXAM CHEST 1 VIEW: CPT

## 2025-01-01 PROCEDURE — 83880 ASSAY OF NATRIURETIC PEPTIDE: CPT

## 2025-01-01 RX ORDER — ONDANSETRON 2 MG/ML
4 INJECTION INTRAMUSCULAR; INTRAVENOUS ONCE
Status: COMPLETED | OUTPATIENT
Start: 2025-01-01 | End: 2025-01-01

## 2025-01-01 RX ORDER — KETOROLAC TROMETHAMINE 30 MG/ML
30 INJECTION, SOLUTION INTRAMUSCULAR; INTRAVENOUS ONCE
Status: COMPLETED | OUTPATIENT
Start: 2025-01-01 | End: 2025-01-01

## 2025-01-01 RX ORDER — SODIUM CHLORIDE 0.9 % (FLUSH) 0.9 %
10 SYRINGE (ML) INJECTION AS NEEDED
Status: DISCONTINUED | OUTPATIENT
Start: 2025-01-01 | End: 2025-01-01 | Stop reason: HOSPADM

## 2025-01-01 RX ORDER — ASPIRIN 81 MG/1
324 TABLET, CHEWABLE ORAL ONCE
Status: DISCONTINUED | OUTPATIENT
Start: 2025-01-01 | End: 2025-01-01 | Stop reason: HOSPADM

## 2025-01-01 RX ADMIN — ONDANSETRON 4 MG: 2 INJECTION INTRAMUSCULAR; INTRAVENOUS at 04:22

## 2025-01-01 RX ADMIN — KETOROLAC TROMETHAMINE 30 MG: 30 INJECTION, SOLUTION INTRAMUSCULAR; INTRAVENOUS at 04:22

## 2025-01-01 NOTE — ED PROVIDER NOTES
Time: 2:59 AM EST  Date of encounter:  1/1/2025  Independent Historian/Clinical History and Information was obtained by:   Patient    History is limited by: N/A    Chief Complaint: Chest pain      History of Present Illness:  Patient is a 49 y.o. year old male who presents to the emergency department for evaluation of chest pain    Patient states for approximately 24 hours he has had discomfort in his chest with some radiation into his head along with a headache.  He checked his blood pressure at home and found it to be elevated with a systolic in the 170 range.  He became alarmed by these findings and decided to come to the emergency department for further evaluation.  He states that this is the first time that his pain has been associated with nausea.  He has had no vomiting or diarrhea.  He denies cough shortness of breath or chest congestion.  He denies fevers or chills.  He has had no treatment prior to arrival.      Patient Care Team  Primary Care Provider: Estevan Lezama APRN    Past Medical History:     No Known Allergies  Past Medical History:   Diagnosis Date    Allergic rhinitis     Asthma     Depression 06/20/2018    Foot pain     GERD (gastroesophageal reflux disease)     Hyperlipidemia 08/22/2017    Hypertension     Testosterone deficiency      Past Surgical History:   Procedure Laterality Date    COLONOSCOPY N/A 09/27/2022    Procedure: COLONOSCOPY;  Surgeon: Lai Augustin MD;  Location: [facility] ENDOSCOPY;  Service: General;  Laterality: N/A;  normal colon     Family History   Problem Relation Age of Onset    No Known Problems Mother     Cancer Father     Lung cancer Father     Colon cancer Other     Malig Hyperthermia Neg Hx        Home Medications:  Prior to Admission medications    Medication Sig Start Date End Date Taking? Authorizing Provider   atenolol (TENORMIN) 25 MG tablet Take 1 tablet by mouth 2 (Two) Times a Day. 8/27/24   Estevan Lezama APRN   atorvastatin  (LIPITOR) 40 MG tablet Take 1 tablet by mouth Every Night. 24   Estevan Lezama APRN   clobetasol (TEMOVATE) 0.05 % ointment APPLY A SMALL AMOUNT TWICE DAILY TO ACTIVE LESIONS    Aleisha Brewster MD   Diclofenac Sodium (VOLTAREN) 1 % gel gel Apply 4 g topically to the appropriate area as directed 4 (Four) Times a Day As Needed (joint pain). 10/9/24   Estevan Lezama APRN   hydroCHLOROthiazide 12.5 MG tablet Take 1 tablet by mouth Daily. 10/9/24   Estevan Lezama APRN   HYDROcodone-acetaminophen (NORCO) 5-325 MG per tablet Take 1 tablet by mouth Every 12 (Twelve) Hours As Needed. 3/2/21   Aleisha Brewster MD   lisinopril (PRINIVIL,ZESTRIL) 40 MG tablet Take 1 tablet by mouth Daily. 24   Estevan Lezama APRN   methylPREDNISolone (MEDROL) 4 MG dose pack Take as directed on package instructions. 10/9/24   Estevan Lezama APRN   polyethylene glycol (MIRALAX) 17 GM/SCOOP powder polyethylene glycol 3350 17 gram/dose oral powder    Aleisha Brewster MD   Testosterone Cypionate (DEPOTESTOTERONE CYPIONATE) 200 MG/ML injection Inject 1 mL into the appropriate muscle as directed by prescriber Every 28 (Twenty-Eight) Days.    Aleisha Brewster MD   triamcinolone (KENALOG) 0.1 % ointment APPLY 1 APPLICATION TOPICALLY TO THE APPROPRIATE AREA AS DIRECTED 2 (TWO) TIMES A DAY FOR 14 DAYS.    Aleisha Brewster MD        Social History:   Social History     Tobacco Use    Smoking status: Former     Current packs/day: 0.00     Average packs/day: 0.5 packs/day for 28.0 years (14.0 ttl pk-yrs)     Types: Cigarettes     Start date: 1990     Quit date: 2018     Years since quittin.0     Passive exposure: Never    Smokeless tobacco: Never    Tobacco comments:     quit smoking a month ago quit 2019   Vaping Use    Vaping status: Some Days    Substances: Nicotine, Flavoring    Devices: Disposable   Substance Use Topics    Alcohol use: Not Currently     " Comment: rarely    Drug use: Never         Review of Systems:  Review of Systems   Constitutional:  Negative for chills and fever.   HENT:  Negative for congestion, ear pain and sore throat.    Eyes:  Negative for pain.   Respiratory:  Negative for cough, chest tightness and shortness of breath.    Cardiovascular:  Positive for chest pain.   Gastrointestinal:  Positive for nausea. Negative for abdominal pain, diarrhea and vomiting.   Genitourinary:  Negative for flank pain and hematuria.   Musculoskeletal:  Negative for joint swelling.   Skin:  Negative for pallor.   Neurological:  Positive for headaches. Negative for seizures.   All other systems reviewed and are negative.       Physical Exam:  /92   Pulse 52   Temp 97.9 °F (36.6 °C) (Oral)   Resp 18   Ht 175.3 cm (69\")   Wt 98.3 kg (216 lb 11.4 oz)   SpO2 99%   BMI 32.00 kg/m²     Physical Exam  Vitals and nursing note reviewed.   Constitutional:       General: He is not in acute distress.     Appearance: Normal appearance. He is not toxic-appearing.   HENT:      Head: Normocephalic and atraumatic.      Jaw: There is normal jaw occlusion.   Eyes:      General: Lids are normal.      Extraocular Movements: Extraocular movements intact.      Conjunctiva/sclera: Conjunctivae normal.      Pupils: Pupils are equal, round, and reactive to light.   Cardiovascular:      Rate and Rhythm: Normal rate and regular rhythm.      Pulses: Normal pulses.      Heart sounds: Normal heart sounds.   Pulmonary:      Effort: Pulmonary effort is normal. No respiratory distress.      Breath sounds: Normal breath sounds. No wheezing or rhonchi.   Abdominal:      General: Abdomen is flat.      Palpations: Abdomen is soft.      Tenderness: There is no abdominal tenderness. There is no guarding or rebound.   Musculoskeletal:         General: Normal range of motion.      Cervical back: Normal range of motion and neck supple.      Right lower leg: No edema.      Left lower leg: No " edema.   Skin:     General: Skin is warm and dry.   Neurological:      Mental Status: He is alert and oriented to person, place, and time. Mental status is at baseline.   Psychiatric:         Mood and Affect: Mood normal.                  Medical Decision Making:      Comorbidities that affect care:    Depression, GERD, hyperlipidemia, hypertension, asthma, testosterone deficiency    External Notes reviewed:    Previous Clinic Note: Outpatient PCP visit for lateral epicondylitis 10/9/2024      The following orders were placed and all results were independently analyzed by me:  Orders Placed This Encounter   Procedures    XR Chest 1 View    Mandan Draw    High Sensitivity Troponin T    Comprehensive Metabolic Panel    Lipase    BNP    Magnesium    CBC Auto Differential    High Sensitivity Troponin T 1Hr    Undress & Gown    Continuous Pulse Oximetry    ECG 12 Lead ED Triage Standing Order; Chest Pain    CBC & Differential    Green Top (Gel)    Lavender Top    Gold Top - SST    Light Blue Top       Medications Given in the Emergency Department:  Medications   ondansetron (ZOFRAN) injection 4 mg (4 mg Intravenous Given 1/1/25 0422)   ketorolac (TORADOL) injection 30 mg (30 mg Intravenous Given 1/1/25 0422)        ED Course:    ED Course as of 01/01/25 0734   Wed Jan 01, 2025   0303 My interpretation of EKG: Sinus rhythm 62, no acute ischemia [JS]      ED Course User Index  [JS] Lalo Galvan MD       Labs:    Lab Results (last 24 hours)       Procedure Component Value Units Date/Time    High Sensitivity Troponin T [[id number]]  (Normal) Collected: 01/01/25 0229    Specimen: Blood Updated: 01/01/25 0253     HS Troponin T 10 ng/L     Narrative:      High Sensitive Troponin T Reference Range:  <14.0 ng/L- Negative Female for AMI  <22.0 ng/L- Negative Male for AMI  >=14 - Abnormal Female indicating possible myocardial injury.  >=22 - Abnormal Male indicating possible myocardial injury.   Clinicians would have to utilize  clinical acumen, EKG, Troponin, and serial changes to determine if it is an Acute Myocardial Infarction or myocardial injury due to an underlying chronic condition.         CBC & Differential [[id number]]  (Normal) Collected: 01/01/25 0229    Specimen: Blood Updated: 01/01/25 0238    Narrative:      The following orders were created for panel order CBC & Differential.  Procedure                               Abnormality         Status                     ---------                               -----------         ------                     CBC Auto Differential[[id number]]        Normal              Final result                 Please view results for these tests on the individual orders.    Comprehensive Metabolic Panel [[id number]]  (Abnormal) Collected: 01/01/25 0229    Specimen: Blood Updated: 01/01/25 0255     Glucose 103 mg/dL      BUN 11 mg/dL      Creatinine 1.45 mg/dL      Sodium 141 mmol/L      Potassium 3.7 mmol/L      Chloride 105 mmol/L      CO2 24.6 mmol/L      Calcium 9.2 mg/dL      Total Protein 6.9 g/dL      Albumin 4.2 g/dL      ALT (SGPT) 21 U/L      AST (SGOT) 20 U/L      Alkaline Phosphatase 56 U/L      Total Bilirubin 0.4 mg/dL      Globulin 2.7 gm/dL      A/G Ratio 1.6 g/dL      BUN/Creatinine Ratio 7.6     Anion Gap 11.4 mmol/L      eGFR 59.4 mL/min/1.73     Narrative:      GFR Categories in Chronic Kidney Disease (CKD)      GFR Category          GFR (mL/min/1.73)    Interpretation  G1                     90 or greater         Normal or high (1)  G2                      60-89                Mild decrease (1)  G3a                   45-59                Mild to moderate decrease  G3b                   30-44                Moderate to severe decrease  G4                    15-29                Severe decrease  G5                    14 or less           Kidney failure          (1)In the absence of evidence of kidney disease, neither GFR category G1 or G2 fulfill the criteria for CKD.    eGFR  calculation 2021 CKD-EPI creatinine equation, which does not include race as a factor    Lipase [[id number]]  (Normal) Collected: 01/01/25 0229    Specimen: Blood Updated: 01/01/25 0255     Lipase 41 U/L     BNP [[id number]]  (Normal) Collected: 01/01/25 0229    Specimen: Blood Updated: 01/01/25 0252     proBNP 152.0 pg/mL     Narrative:      This assay is used as an aid in the diagnosis of individuals suspected of having heart failure. It can be used as an aid in the diagnosis of acute decompensated heart failure (ADHF) in patients presenting with signs and symptoms of ADHF to the emergency department (ED). In addition, NT-proBNP of <300 pg/mL indicates ADHF is not likely.    Age Range Result Interpretation  NT-proBNP Concentration (pg/mL:      <50             Positive            >450                   Gray                 300-450                    Negative             <300    50-75           Positive            >900                  Gray                300-900                  Negative            <300      >75             Positive            >1800                  Gray                300-1800                  Negative            <300    Magnesium [[id number]]  (Normal) Collected: 01/01/25 0229    Specimen: Blood Updated: 01/01/25 0255     Magnesium 2.0 mg/dL     CBC Auto Differential [[id number]]  (Normal) Collected: 01/01/25 0229    Specimen: Blood Updated: 01/01/25 0238     WBC 8.01 10*3/mm3      RBC 4.57 10*6/mm3      Hemoglobin 13.0 g/dL      Hematocrit 40.0 %      MCV 87.5 fL      MCH 28.4 pg      MCHC 32.5 g/dL      RDW 13.2 %      RDW-SD 42.3 fl      MPV 9.9 fL      Platelets 236 10*3/mm3      Neutrophil % 53.3 %      Lymphocyte % 34.1 %      Monocyte % 9.1 %      Eosinophil % 2.1 %      Basophil % 1.0 %      Immature Grans % 0.4 %      Neutrophils, Absolute 4.27 10*3/mm3      Lymphocytes, Absolute 2.73 10*3/mm3      Monocytes, Absolute 0.73 10*3/mm3      Eosinophils, Absolute 0.17 10*3/mm3      Basophils,  Absolute 0.08 10*3/mm3      Immature Grans, Absolute 0.03 10*3/mm3      nRBC 0.0 /100 WBC     High Sensitivity Troponin T 1Hr [[id number]]  (Normal) Collected: 01/01/25 0424    Specimen: Blood Updated: 01/01/25 0455     HS Troponin T 9 ng/L      Troponin T Numeric Delta -1 ng/L     Narrative:      High Sensitive Troponin T Reference Range:  <14.0 ng/L- Negative Female for AMI  <22.0 ng/L- Negative Male for AMI  >=14 - Abnormal Female indicating possible myocardial injury.  >=22 - Abnormal Male indicating possible myocardial injury.   Clinicians would have to utilize clinical acumen, EKG, Troponin, and serial changes to determine if it is an Acute Myocardial Infarction or myocardial injury due to an underlying chronic condition.                  Imaging:    XR Chest 1 View    Result Date: 1/1/2025  XR CHEST 1 VW Date of exam: 1/1/2025, 2:37 A.M., EST. Indication: Chest pain. Comparison: 12/21/2022. FINDINGS: A single AP (or PA) upright portable chest radiograph was performed. No cardiac enlargement is seen. No acute infiltrate is appreciated. No pleural effusion or pneumothorax is identified. External artifacts obscure detail. There is pulmonary hypoinflation. The thoracic aorta is atherosclerotic. No significant interval change is seen since the prior study (or studies).      No acute infiltrate is appreciated.    Portions of this note were completed with a voice recognition program. Electronically Signed: Cristian White MD  1/1/2025 2:39 AM EST  Workstation ID: [id number]       Differential Diagnosis and Discussion:    Chest Pain:  Based on the patient's signs and symptoms, I considered aortic dissection, myocardial infaction, pulmonary embolism, cardiac tamponade, pericarditis, pneumothorax, musculoskeletal chest pain and other differential diagnosis as an etiology of the patient's chest pain.     PROCEDURES:    Labs were collected in the emergency department and all labs were reviewed and interpreted by me.  X-ray  were performed in the emergency department and all X-ray impressions were independently interpreted by me.  An EKG was performed and the EKG was interpreted by me.    ECG 12 Lead ED Triage Standing Order; Chest Pain   Preliminary Result   HEART RATE=62  bpm   RR Gzzjmdih=514  ms   SC Cszqahzc=661  ms   P Horizontal Axis=27  deg   P Front Axis=35  deg   QRSD Interval=91  ms   QT Upoqvgst=069  ms   FXhW=955  ms   QRS Axis=-11  deg   T Wave Axis=16  deg   - NORMAL ECG -   Sinus rhythm   Date and Time of Study:2025-01-01 02:19:03          Procedures    MDM                     Patient Care Considerations:    NARCOTICS: I considered prescribing opiate pain medication as an outpatient, however no narcotic pain control required in the ED.      Consultants/Shared Management Plan:    None    Social Determinants of Health:    Patient has presented with family members who are responsible, reliable and will ensure follow up care.      Disposition and Care Coordination:    Discharged: The patient is suitable and stable for discharge with no need for consideration of admission.    I have explained the patient´s condition, diagnoses and treatment plan based on the information available to me at this time. I have answered questions and addressed any concerns. The patient has a good  understanding of the patient´s diagnosis, condition, and treatment plan as can be expected at this point. The vital signs have been stable. The patient´s condition is stable and appropriate for discharge from the emergency department.      The patient will pursue further outpatient evaluation with the primary care physician or other designated or consulting physician as outlined in the discharge instructions. They are agreeable to this plan of care and follow-up instructions have been explained in detail. The patient has received these instructions in written format and has expressed an understanding of the discharge instructions. The patient is aware  that any significant change in condition or worsening of symptoms should prompt an immediate return to this or the closest emergency department or call to 911.  I have explained discharge medications and the need for follow up with the patient/caretakers. This was also printed in the discharge instructions. Patient was discharged with the following medications and follow up:      Medication List      No changes were made to your prescriptions during this visit.      Estevan Lezama, APRN  [address] [place] [address]  [phone]    Schedule an appointment as soon as possible for a visit          Final diagnoses:   Chest pain, unspecified type   Hypertension, unspecified type        ED Disposition       ED Disposition   Discharge    Condition   Stable    Comment   --               This medical record created using voice recognition software.             Lalo Galvan MD  01/01/25 1608

## 2025-01-02 ENCOUNTER — TELEPHONE (OUTPATIENT)
Dept: FAMILY MEDICINE CLINIC | Facility: CLINIC | Age: 50
End: 2025-01-02
Payer: COMMERCIAL

## 2025-01-13 NOTE — PROGRESS NOTES
Transitional Care Follow Up Visit     Patient Name: Avtar Chapman  : 1975   MRN: [id number]     Chief Complaint:    Chief Complaint   Patient presents with    Hypertension    Depression    Hyperlipidemia    Anemia    Heartburn       History of Present Illness: Avtar Chapman is a 49 y.o. male who presents today for  follow up ER   Review lab results     Patient also has dx of : HTN, depression, hyperlipidemia, anemia, IFG, GERD.    Patient checking B/P at home running it is varies     Patient has only been taking 20mg of the lipitor         Labs-2025  Psa- 2024  Colonoscopy- 2022     FH denies family history of CAD  Father passed from lung cancer     Time: 2:59 AM EST  Date of encounter:  2025  Independent Historian/Clinical History and Information was obtained by:   Patient          Chief Complaint: Chest pain        History of Present Illness:  Patient is a 49 y.o. year old male who presents to the emergency department for evaluation of chest pain     Patient states for approximately 24 hours he has had discomfort in his chest with some radiation into his head along with a headache.  He checked his blood pressure at home and found it to be elevated with a systolic in the 170 range.  He became alarmed by these findings and decided to come to the emergency department for further evaluation.  He states that this is the first time that his pain has been associated with nausea.  He has had no vomiting or diarrhea.  He denies cough shortness of breath or chest congestion.  He denies fevers or chills.  He has had no treatment prior to arrival.    Disposition and Care Coordination:     Discharged: The patient is suitable and stable for discharge with no need for consideration of admission.     I have explained the patient´s condition, diagnoses and treatment plan based on the information available to me at this time. I have answered questions and addressed any concerns. The patient has a good   understanding of the patient´s diagnosis, condition, and treatment plan as can be expected at this point. The vital signs have been stable. The patient´s condition is stable and appropriate for discharge from the emergency department.      The patient will pursue further outpatient evaluation with the primary care physician or other designated or consulting physician as outlined in the discharge instructions. They are agreeable to this plan of care and follow-up instructions have been explained in detail. The patient has received these instructions in written format and has expressed an understanding of the discharge instructions. The patient is aware that any significant change in condition or worsening of symptoms should prompt an immediate return to this or the closest emergency department or call to 911.  I have explained discharge medications and the need for follow up with the patient/caretakers. This was also printed in the discharge instructions. Patient was discharged with the following medications and follow up:           Pt reports took mobic prior to ER visit 3-4 days in a row, went back to gym prior to prior to visit having some neck pain and headache    Patient denies chest pain since ER visit  Patient reports he never started hydrochlorothiazide  Patient reports he has taken a break from his testosterone placement therapy that he receives from online provider  Patient does have a follow-up with cardiology scheduled    Subjective      Review of Systems:   Review of Systems   Constitutional:  Negative for fever.   Respiratory:  Negative for cough.    Cardiovascular:  Negative for chest pain.   Neurological:  Negative for dizziness and headache.       I reviewed and discussed the details of that call along with the discharge summary, hospital problems, inpatient lab results, inpatient diagnostic studies, and consultation reports with Avtar Chapman.     Current outpatient and discharge medications  "have been reconciled for the patient.       No data to display                Medications:     Current Outpatient Medications:     atenolol (TENORMIN) 25 MG tablet, Take 1 tablet by mouth 2 (Two) Times a Day., Disp: 180 tablet, Rfl: 1    atorvastatin (LIPITOR) 40 MG tablet, Take 1 tablet by mouth Every Night. (Patient taking differently: Take 0.5 tablets by mouth Every Night.), Disp: 90 tablet, Rfl: 1    HYDROcodone-acetaminophen (NORCO) 5-325 MG per tablet, Take 1 tablet by mouth Every 12 (Twelve) Hours As Needed., Disp: , Rfl:     lisinopril (PRINIVIL,ZESTRIL) 40 MG tablet, Take 1 tablet by mouth Daily., Disp: 90 tablet, Rfl: 1    Testosterone Cypionate (DEPOTESTOTERONE CYPIONATE) 200 MG/ML injection, Inject 1 mL into the appropriate muscle as directed by prescriber Every 28 (Twenty-Eight) Days., Disp: , Rfl:     polyethylene glycol (MIRALAX) 17 GM/SCOOP powder, polyethylene glycol 3350 17 gram/dose oral powder (Patient not taking: Reported on 1/17/2025), Disp: , Rfl:     Allergies:   No Known Allergies        Objective     Physical Exam:  Vital Signs:   Vitals:    01/17/25 1612   BP: 131/86   Pulse: 65   Temp: 97.8 °F (36.6 °C)   SpO2: 98%   Weight: 93.5 kg (206 lb 3.2 oz)   Height: 175.3 cm (69\")     Body mass index is 30.45 kg/m².     Physical Exam  Eyes:      Conjunctiva/sclera: Conjunctivae normal.   Neck:      Vascular: No carotid bruit.   Cardiovascular:      Rate and Rhythm: Normal rate and regular rhythm.      Heart sounds: Normal heart sounds. No murmur heard.  Pulmonary:      Effort: Pulmonary effort is normal.      Breath sounds: Normal breath sounds.   Musculoskeletal:      Right lower leg: No edema.      Left lower leg: No edema.   Skin:     General: Skin is warm and dry.   Neurological:      Mental Status: He is alert.   Psychiatric:         Mood and Affect: Mood normal.         Behavior: Behavior normal.         Assessment / Plan      Assessment/Plan:   Diagnoses and all orders for this " "visit:    1. Chest pain, unspecified type (Primary)    2. Primary hypertension    3. Mixed hyperlipidemia    4. Palpitations    5. Renal insufficiency       Chest pain resolved no episodes since ER if recurrent episode of chest pain would obtain stress test possible CT cardiac calcium score  Hypertension currently controlled lisinopril and atenolol  Hyperlipidemia LDL below goal on current statin dose Lipitor 20 mg at nighttime denies myalgias liver enzymes normal  Palpitations currently controlled with atenolol  Renal insufficiency resolved patient avoid NSAIDs increase water intake      Follow Up:   Return in about 6 months (around 7/17/2025).      Teja Barreto, APRN    \"Please note that portions of this note were completed with a voice recognition program.\"        "

## 2025-01-14 ENCOUNTER — LAB (OUTPATIENT)
Dept: LAB | Facility: HOSPITAL | Age: 50
End: 2025-01-14
Payer: COMMERCIAL

## 2025-01-14 DIAGNOSIS — E78.2 MIXED HYPERLIPIDEMIA: ICD-10-CM

## 2025-01-14 LAB
ALBUMIN SERPL-MCNC: 4.3 G/DL (ref 3.5–5.2)
ALBUMIN/GLOB SERPL: 1.6 G/DL
ALP SERPL-CCNC: 65 U/L (ref 39–117)
ALT SERPL W P-5'-P-CCNC: 10 U/L (ref 1–41)
ANION GAP SERPL CALCULATED.3IONS-SCNC: 8.7 MMOL/L (ref 5–15)
AST SERPL-CCNC: 17 U/L (ref 1–40)
BILIRUB SERPL-MCNC: 0.3 MG/DL (ref 0–1.2)
BUN SERPL-MCNC: 12 MG/DL (ref 6–20)
BUN/CREAT SERPL: 9.8 (ref 7–25)
CALCIUM SPEC-SCNC: 9.3 MG/DL (ref 8.6–10.5)
CHLORIDE SERPL-SCNC: 106 MMOL/L (ref 98–107)
CHOLEST SERPL-MCNC: 118 MG/DL (ref 0–200)
CO2 SERPL-SCNC: 26.3 MMOL/L (ref 22–29)
CREAT SERPL-MCNC: 1.23 MG/DL (ref 0.76–1.27)
EGFRCR SERPLBLD CKD-EPI 2021: 72.4 ML/MIN/1.73
GLOBULIN UR ELPH-MCNC: 2.7 GM/DL
GLUCOSE SERPL-MCNC: 91 MG/DL (ref 65–99)
HDLC SERPL-MCNC: 27 MG/DL (ref 40–60)
LDLC SERPL CALC-MCNC: 65 MG/DL (ref 0–100)
LDLC/HDLC SERPL: 2.26 {RATIO}
POTASSIUM SERPL-SCNC: 4.3 MMOL/L (ref 3.5–5.2)
PROT SERPL-MCNC: 7 G/DL (ref 6–8.5)
QT INTERVAL: 370 MS
QTC INTERVAL: 377 MS
SODIUM SERPL-SCNC: 141 MMOL/L (ref 136–145)
TRIGL SERPL-MCNC: 150 MG/DL (ref 0–150)
VLDLC SERPL-MCNC: 26 MG/DL (ref 5–40)

## 2025-01-14 PROCEDURE — 36415 COLL VENOUS BLD VENIPUNCTURE: CPT

## 2025-01-17 ENCOUNTER — OFFICE VISIT (OUTPATIENT)
Dept: FAMILY MEDICINE CLINIC | Facility: CLINIC | Age: 50
End: 2025-01-17
Payer: COMMERCIAL

## 2025-01-17 VITALS
SYSTOLIC BLOOD PRESSURE: 131 MMHG | DIASTOLIC BLOOD PRESSURE: 86 MMHG | WEIGHT: 206.2 LBS | HEART RATE: 65 BPM | OXYGEN SATURATION: 98 % | HEIGHT: 69 IN | BODY MASS INDEX: 30.54 KG/M2 | TEMPERATURE: 97.8 F

## 2025-01-17 DIAGNOSIS — R07.9 CHEST PAIN, UNSPECIFIED TYPE: Primary | ICD-10-CM

## 2025-01-17 DIAGNOSIS — I10 PRIMARY HYPERTENSION: ICD-10-CM

## 2025-01-17 DIAGNOSIS — E78.2 MIXED HYPERLIPIDEMIA: ICD-10-CM

## 2025-01-17 DIAGNOSIS — N28.9 RENAL INSUFFICIENCY: ICD-10-CM

## 2025-01-17 DIAGNOSIS — R00.2 PALPITATIONS: ICD-10-CM

## 2025-05-07 RX ORDER — LISINOPRIL 40 MG/1
40 TABLET ORAL DAILY
Qty: 90 TABLET | Refills: 1 | Status: SHIPPED | OUTPATIENT
Start: 2025-05-07

## 2025-05-14 RX ORDER — LISINOPRIL 40 MG/1
40 TABLET ORAL DAILY
Qty: 90 TABLET | Refills: 1 | Status: SHIPPED | OUTPATIENT
Start: 2025-05-14

## 2025-06-04 ENCOUNTER — OFFICE VISIT (OUTPATIENT)
Dept: CARDIOLOGY | Facility: CLINIC | Age: 50
End: 2025-06-04
Payer: COMMERCIAL

## 2025-06-04 VITALS
HEART RATE: 58 BPM | BODY MASS INDEX: 30.66 KG/M2 | SYSTOLIC BLOOD PRESSURE: 144 MMHG | DIASTOLIC BLOOD PRESSURE: 98 MMHG | WEIGHT: 207 LBS | HEIGHT: 69 IN

## 2025-06-04 DIAGNOSIS — E78.2 MIXED HYPERLIPIDEMIA: ICD-10-CM

## 2025-06-04 DIAGNOSIS — I10 ESSENTIAL HYPERTENSION: Primary | ICD-10-CM

## 2025-06-04 PROCEDURE — 3080F DIAST BP >= 90 MM HG: CPT | Performed by: INTERNAL MEDICINE

## 2025-06-04 PROCEDURE — 99214 OFFICE O/P EST MOD 30 MIN: CPT | Performed by: INTERNAL MEDICINE

## 2025-06-04 PROCEDURE — 1159F MED LIST DOCD IN RCRD: CPT | Performed by: INTERNAL MEDICINE

## 2025-06-04 PROCEDURE — 1160F RVW MEDS BY RX/DR IN RCRD: CPT | Performed by: INTERNAL MEDICINE

## 2025-06-04 PROCEDURE — 3077F SYST BP >= 140 MM HG: CPT | Performed by: INTERNAL MEDICINE

## 2025-06-04 RX ORDER — HYDROCHLOROTHIAZIDE 25 MG/1
25 TABLET ORAL DAILY
Qty: 90 TABLET | Refills: 3 | Status: SHIPPED | OUTPATIENT
Start: 2025-06-04

## 2025-06-04 NOTE — PROGRESS NOTES
Chief Complaint  1 yr follow up , Hypertension, and Hyperlipidemia    Subjective            Avtar Chapman presents to [facility] CARDIOLOGY      Mr. Chapman is here for follow-up evaluation management of essential hypertension, mixed hyperlipidemia.  His home blood pressure readings have been running elevated.  He has no cardiovascular symptoms.    PMH  Past Medical History:   Diagnosis Date    Allergic rhinitis     Asthma     Depression 2018    Foot pain     GERD (gastroesophageal reflux disease)     Hyperlipidemia 2017    Hypertension     Testosterone deficiency          SURGICALHX  Past Surgical History:   Procedure Laterality Date    COLONOSCOPY N/A 2022    Procedure: COLONOSCOPY;  Surgeon: Lai Augustin MD;  Location: [facility] ENDOSCOPY;  Service: General;  Laterality: N/A;  normal colon        SOC  Social History     Socioeconomic History    Marital status: Single   Tobacco Use    Smoking status: Former     Current packs/day: 0.00     Average packs/day: 0.5 packs/day for 28.0 years (14.0 ttl pk-yrs)     Types: Cigarettes     Start date: 1990     Quit date: 2018     Years since quittin.4     Passive exposure: Never    Smokeless tobacco: Never    Tobacco comments:     quit smoking a month ago quit 2019   Vaping Use    Vaping status: Some Days    Substances: Nicotine, Flavoring    Devices: Disposable   Substance and Sexual Activity    Alcohol use: Not Currently    Drug use: Never    Sexual activity: Defer         FAMHX  Family History   Problem Relation Age of Onset    No Known Problems Mother     Cancer Father     Lung cancer Father     Colon cancer Other     Malig Hyperthermia Neg Hx           ALLERGY  No Known Allergies     MEDSCURRENT    Current Outpatient Medications:     atenolol (TENORMIN) 25 MG tablet, Take 1 tablet by mouth 2 (Two) Times a Day., Disp: 180 tablet, Rfl: 1    atorvastatin (LIPITOR) 40 MG tablet, Take 1 tablet by mouth Every Night.  "(Patient taking differently: Take 0.5 tablets by mouth Every Night.), Disp: 90 tablet, Rfl: 1    HYDROcodone-acetaminophen (NORCO) 5-325 MG per tablet, Take 1 tablet by mouth Every 12 (Twelve) Hours As Needed., Disp: , Rfl:     lisinopril (PRINIVIL,ZESTRIL) 40 MG tablet, Take 1 tablet by mouth Daily., Disp: 90 tablet, Rfl: 1    Testosterone Cypionate (DEPOTESTOTERONE CYPIONATE) 200 MG/ML injection, Inject 1 mL into the appropriate muscle as directed by prescriber Every 28 (Twenty-Eight) Days., Disp: , Rfl:     hydroCHLOROthiazide 25 MG tablet, Take 1 tablet by mouth Daily., Disp: 90 tablet, Rfl: 3    polyethylene glycol (MIRALAX) 17 GM/SCOOP powder, polyethylene glycol 3350 17 gram/dose oral powder (Patient not taking: Reported on 6/4/2025), Disp: , Rfl:       Review of Systems   Cardiovascular:  Negative for chest pain, dyspnea on exertion, irregular heartbeat and palpitations.        Objective     /98   Pulse 58   Ht 175.3 cm (69\")   Wt 93.9 kg (207 lb)   BMI 30.57 kg/m²       General Appearance:   well developed  well nourished  HENT:   oropharynx moist  lips not cyanotic  Neck:  thyroid not enlarged  supple  Respiratory:  no respiratory distress  normal breath sounds  no rales  Cardiovascular:  no jugular venous distention  regular rhythm  apical impulse normal  S1 normal, S2 normal  no S3, no S4   no murmur  no rub, no thrill  carotid pulses normal; no bruit  pedal pulses normal  lower extremity edema: none    Musculoskeletal:  no clubbing of fingers.   normocephalic, head atraumatic  Skin:   warm, dry  Psychiatric:  judgement and insight appropriate  normal mood and affect      Result Review :     The following data was reviewed by: Gerald Lynch MD on 06/04/2024:    CMP          10/9/2024    14:54 1/1/2025    02:29 1/14/2025    15:23   CMP   Glucose 86  103  91    BUN 9  11  12    Creatinine 1.48  1.45  1.23    EGFR 58.4  59.4  72.4    Sodium 136  141  141    Potassium 3.9  3.7  4.3  "   Chloride 101  105  106    Calcium 9.8  9.2  9.3    Total Protein 6.8  6.9  7.0    Albumin 4.2  4.2  4.3    Globulin 2.6  2.7  2.7    Total Bilirubin 0.7  0.4  0.3    Alkaline Phosphatase 56  56  65    AST (SGOT) 13  20  17    ALT (SGPT) 15  21  10    Albumin/Globulin Ratio 1.6  1.6  1.6    BUN/Creatinine Ratio 6.1  7.6  9.8    Anion Gap 10.0  11.4  8.7      CBC          8/27/2024    07:52 1/1/2025    02:29   CBC   WBC 10.02  8.01    RBC 5.23  4.57    Hemoglobin 15.2  13.0    Hematocrit 46.8  40.0    MCV 89.5  87.5    MCH 29.1  28.4    MCHC 32.5  32.5    RDW 12.9  13.2    Platelets 275  236      Lipid Panel          8/27/2024    07:52 10/9/2024    14:54 1/14/2025    15:23   Lipid Panel   Total Cholesterol 137  209  118    Triglycerides 119  116  150    HDL Cholesterol 26  28  27    VLDL Cholesterol 22  21  26    LDL Cholesterol  89  160  65    LDL/HDL Ratio 3.35  5.64  2.26      TSH          8/27/2024    07:52   TSH   TSH 1.380        Data reviewed : Primary care note reviewed     Procedures             Assessment and Plan        ASSESSMENT:  Encounter Diagnoses   Name Primary?    Essential hypertension Yes    Mixed hyperlipidemia          PLAN:    1.  Essential hypertension-uncontrolled.  Add HCTZ 25 mg daily.  He is going to try to lose some weight as well.  Check basic metabolic panel in 2 weeks.  He will send me a [employer] message regarding home blood pressure readings  2.  Mixed hyperlipidemia-controlled, continue statin therapy  3.  Follow-up annually otherwise.          Patient was given instructions and counseling regarding his condition or for health maintenance advice. Please see specific information pulled into the AVS if appropriate.           Gerald Lynch MD   6/4/2025  09:49 EDT

## 2025-07-18 ENCOUNTER — OFFICE VISIT (OUTPATIENT)
Dept: FAMILY MEDICINE CLINIC | Facility: CLINIC | Age: 50
End: 2025-07-18
Payer: COMMERCIAL

## 2025-07-18 VITALS
BODY MASS INDEX: 31.19 KG/M2 | TEMPERATURE: 96.8 F | WEIGHT: 210.6 LBS | HEIGHT: 69 IN | HEART RATE: 61 BPM | DIASTOLIC BLOOD PRESSURE: 71 MMHG | SYSTOLIC BLOOD PRESSURE: 111 MMHG | OXYGEN SATURATION: 98 %

## 2025-07-18 DIAGNOSIS — Z12.5 SCREENING PSA (PROSTATE SPECIFIC ANTIGEN): ICD-10-CM

## 2025-07-18 DIAGNOSIS — R00.2 PALPITATIONS: ICD-10-CM

## 2025-07-18 DIAGNOSIS — R73.01 IMPAIRED FASTING GLUCOSE: ICD-10-CM

## 2025-07-18 DIAGNOSIS — E78.2 MIXED HYPERLIPIDEMIA: ICD-10-CM

## 2025-07-18 DIAGNOSIS — I10 PRIMARY HYPERTENSION: ICD-10-CM

## 2025-07-18 DIAGNOSIS — Z13.29 SCREENING FOR THYROID DISORDER: ICD-10-CM

## 2025-07-18 DIAGNOSIS — Z00.00 ANNUAL PHYSICAL EXAM: ICD-10-CM

## 2025-07-18 DIAGNOSIS — R79.89 LOW TESTOSTERONE IN MALE: ICD-10-CM

## 2025-07-18 LAB
ALBUMIN SERPL-MCNC: 3.8 G/DL (ref 3.5–5.2)
ALBUMIN/GLOB SERPL: 1.3 G/DL
ALP SERPL-CCNC: 54 U/L (ref 39–117)
ALT SERPL W P-5'-P-CCNC: 13 U/L (ref 1–41)
ANION GAP SERPL CALCULATED.3IONS-SCNC: 9.9 MMOL/L (ref 5–15)
AST SERPL-CCNC: 11 U/L (ref 1–40)
BASOPHILS # BLD AUTO: 0.11 10*3/MM3 (ref 0–0.2)
BASOPHILS NFR BLD AUTO: 1.3 % (ref 0–1.5)
BILIRUB SERPL-MCNC: 0.3 MG/DL (ref 0–1.2)
BUN SERPL-MCNC: 13 MG/DL (ref 6–20)
BUN/CREAT SERPL: 6.7 (ref 7–25)
CALCIUM SPEC-SCNC: 9.7 MG/DL (ref 8.6–10.5)
CHLORIDE SERPL-SCNC: 106 MMOL/L (ref 98–107)
CHOLEST SERPL-MCNC: 122 MG/DL (ref 0–200)
CO2 SERPL-SCNC: 25.1 MMOL/L (ref 22–29)
CREAT SERPL-MCNC: 1.95 MG/DL (ref 0.76–1.27)
DEPRECATED RDW RBC AUTO: 43.9 FL (ref 37–54)
EGFRCR SERPLBLD CKD-EPI 2021: 41.7 ML/MIN/1.73
EOSINOPHIL # BLD AUTO: 0.64 10*3/MM3 (ref 0–0.4)
EOSINOPHIL NFR BLD AUTO: 7.4 % (ref 0.3–6.2)
ERYTHROCYTE [DISTWIDTH] IN BLOOD BY AUTOMATED COUNT: 13.1 % (ref 12.3–15.4)
ESTRADIOL SERPL HS-MCNC: 44.9 PG/ML
GLOBULIN UR ELPH-MCNC: 2.9 GM/DL
GLUCOSE SERPL-MCNC: 93 MG/DL (ref 65–99)
HBA1C MFR BLD: 5.5 % (ref 4.8–5.6)
HCT VFR BLD AUTO: 42.3 % (ref 37.5–51)
HDLC SERPL-MCNC: 22 MG/DL (ref 40–60)
HGB BLD-MCNC: 13.6 G/DL (ref 13–17.7)
IMM GRANULOCYTES # BLD AUTO: 0.01 10*3/MM3 (ref 0–0.05)
IMM GRANULOCYTES NFR BLD AUTO: 0.1 % (ref 0–0.5)
LDLC SERPL CALC-MCNC: 66 MG/DL (ref 0–100)
LDLC/HDLC SERPL: 2.71 {RATIO}
LYMPHOCYTES # BLD AUTO: 2.68 10*3/MM3 (ref 0.7–3.1)
LYMPHOCYTES NFR BLD AUTO: 31 % (ref 19.6–45.3)
MCH RBC QN AUTO: 29.4 PG (ref 26.6–33)
MCHC RBC AUTO-ENTMCNC: 32.2 G/DL (ref 31.5–35.7)
MCV RBC AUTO: 91.6 FL (ref 79–97)
MONOCYTES # BLD AUTO: 0.87 10*3/MM3 (ref 0.1–0.9)
MONOCYTES NFR BLD AUTO: 10.1 % (ref 5–12)
NEUTROPHILS NFR BLD AUTO: 4.33 10*3/MM3 (ref 1.7–7)
NEUTROPHILS NFR BLD AUTO: 50.1 % (ref 42.7–76)
NRBC BLD AUTO-RTO: 0 /100 WBC (ref 0–0.2)
PLATELET # BLD AUTO: 292 10*3/MM3 (ref 140–450)
PMV BLD AUTO: 10.8 FL (ref 6–12)
POTASSIUM SERPL-SCNC: 4.3 MMOL/L (ref 3.5–5.2)
PROT SERPL-MCNC: 6.7 G/DL (ref 6–8.5)
PSA SERPL-MCNC: 1.18 NG/ML (ref 0–4)
RBC # BLD AUTO: 4.62 10*6/MM3 (ref 4.14–5.8)
SODIUM SERPL-SCNC: 141 MMOL/L (ref 136–145)
TRIGL SERPL-MCNC: 202 MG/DL (ref 0–150)
TSH SERPL DL<=0.05 MIU/L-ACNC: 1.37 UIU/ML (ref 0.27–4.2)
VLDLC SERPL-MCNC: 34 MG/DL (ref 5–40)
WBC NRBC COR # BLD AUTO: 8.64 10*3/MM3 (ref 3.4–10.8)

## 2025-07-18 PROCEDURE — 84443 ASSAY THYROID STIM HORMONE: CPT | Performed by: NURSE PRACTITIONER

## 2025-07-18 PROCEDURE — 80061 LIPID PANEL: CPT | Performed by: NURSE PRACTITIONER

## 2025-07-18 PROCEDURE — G0103 PSA SCREENING: HCPCS | Performed by: NURSE PRACTITIONER

## 2025-07-18 PROCEDURE — 85025 COMPLETE CBC W/AUTO DIFF WBC: CPT | Performed by: NURSE PRACTITIONER

## 2025-07-18 PROCEDURE — 83036 HEMOGLOBIN GLYCOSYLATED A1C: CPT | Performed by: NURSE PRACTITIONER

## 2025-07-18 PROCEDURE — 80053 COMPREHEN METABOLIC PANEL: CPT | Performed by: NURSE PRACTITIONER

## 2025-07-18 PROCEDURE — 84403 ASSAY OF TOTAL TESTOSTERONE: CPT | Performed by: NURSE PRACTITIONER

## 2025-07-18 PROCEDURE — 81003 URINALYSIS AUTO W/O SCOPE: CPT | Performed by: NURSE PRACTITIONER

## 2025-07-18 PROCEDURE — 82670 ASSAY OF TOTAL ESTRADIOL: CPT | Performed by: NURSE PRACTITIONER

## 2025-07-18 PROCEDURE — 84402 ASSAY OF FREE TESTOSTERONE: CPT | Performed by: NURSE PRACTITIONER

## 2025-07-18 RX ORDER — ATORVASTATIN CALCIUM 40 MG/1
40 TABLET, FILM COATED ORAL NIGHTLY
Qty: 90 TABLET | Refills: 1 | Status: SHIPPED | OUTPATIENT
Start: 2025-07-18

## 2025-07-18 RX ORDER — ATENOLOL 25 MG/1
25 TABLET ORAL 2 TIMES DAILY
Qty: 180 TABLET | Refills: 1 | Status: SHIPPED | OUTPATIENT
Start: 2025-07-18

## 2025-07-18 RX ORDER — LISINOPRIL 40 MG/1
40 TABLET ORAL DAILY
Qty: 90 TABLET | Refills: 1 | Status: SHIPPED | OUTPATIENT
Start: 2025-07-18

## 2025-07-18 NOTE — PROGRESS NOTES
Patient Name: Avtar Chapman  : 1975   MRN: [id number]     Chief Complaint: Annual physical exam    History of Present Illness: Avtar Chapman is a 49 y.o. male who is here today to annual physical exam  Also follow up for  HTN, depression, hyperlipidemia, anemia, IFG, GERD.     B/P running  98/60  Patient was moved up to 25mg  but pt states he was not even taking 12.5   he thinks the 25mg is lower B/p too fast and too low        Labs-2025  Psa- 2024  Colonoscopy- 2022     Pt reports exerising 2 x per week at the gym     Follow-up May start hydrochlorothiazide per cardiology  Follow-up cardiology per progress note Gerald Lynch MD   2025     PLAN:     1.  Essential hypertension-uncontrolled.  Add HCTZ 25 mg daily.  He is going to try to lose some weight as well.  Check basic metabolic panel in 2 weeks.  He will send me a [email or web address] message regarding home blood pressure readings  2.  Mixed hyperlipidemia-controlled, continue statin therapy  3.  Follow-up annually otherwise.             Testosterone replacement therapy prescribed by RACHEL WALTON II     Subjective      Review of Systems:   Review of Systems   Constitutional:  Negative for fever.   Respiratory:  Negative for cough.    Cardiovascular:  Negative for chest pain, palpitations and leg swelling.   Gastrointestinal:  Negative for abdominal pain.   Genitourinary:  Negative for dysuria.   Musculoskeletal:  Negative for myalgias.   Neurological:  Positive for light-headedness. Negative for dizziness and headaches.        Past Medical History:   Past Medical History:   Diagnosis Date    Allergic rhinitis     Asthma     Depression 2018    Foot pain     GERD (gastroesophageal reflux disease)     Hyperlipidemia 2017    Hypertension     Testosterone deficiency        Past Surgical History:   Past Surgical History:   Procedure Laterality Date    COLONOSCOPY N/A 2022    Procedure: COLONOSCOPY;  Surgeon:  Lai Augustin MD;  Location: [facility] ENDOSCOPY;  Service: General;  Laterality: N/A;  normal colon       Family History:   Family History   Problem Relation Age of Onset    No Known Problems Mother     Cancer Father     Lung cancer Father     Colon cancer Other     Malig Hyperthermia Neg Hx        Social History:   Social History     Socioeconomic History    Marital status: Single   Tobacco Use    Smoking status: Former     Current packs/day: 0.00     Average packs/day: 0.5 packs/day for 28.0 years (14.0 ttl pk-yrs)     Types: Cigarettes     Start date: 1990     Quit date: 2018     Years since quittin.5     Passive exposure: Never    Smokeless tobacco: Never    Tobacco comments:     quit smoking a month ago quit 2019   Vaping Use    Vaping status: Some Days    Substances: Nicotine, Flavoring    Devices: Disposable   Substance and Sexual Activity    Alcohol use: Not Currently    Drug use: Never    Sexual activity: Defer       Medications:     Current Outpatient Medications:     atenolol (TENORMIN) 25 MG tablet, Take 1 tablet by mouth 2 (Two) Times a Day., Disp: 180 tablet, Rfl: 1    atorvastatin (LIPITOR) 40 MG tablet, Take 1 tablet by mouth Every Night., Disp: 90 tablet, Rfl: 1    hydroCHLOROthiazide 25 MG tablet, Take 1 tablet by mouth Daily., Disp: 90 tablet, Rfl: 3    HYDROcodone-acetaminophen (NORCO) 5-325 MG per tablet, Take 1 tablet by mouth Every 12 (Twelve) Hours As Needed., Disp: , Rfl:     lisinopril (PRINIVIL,ZESTRIL) 40 MG tablet, Take 1 tablet by mouth Daily., Disp: 90 tablet, Rfl: 1    Testosterone Cypionate (DEPOTESTOTERONE CYPIONATE) 200 MG/ML injection, Inject 1 mL into the appropriate muscle as directed by prescriber Every 28 (Twenty-Eight) Days., Disp: , Rfl:     Allergies:   No Known Allergies        Objective     Physical Exam:  Vital Signs:   Vitals:    25 1007   BP: 111/71   Pulse: 61   Temp: 96.8 °F (36 °C)   SpO2: 98%   Weight: 95.5 kg (210 lb 9.6 oz)   Height: 175.3 cm  "(69\")   PainSc: 0-No pain     Body mass index is 31.1 kg/m².           Physical Exam  HENT:      Right Ear: Tympanic membrane normal.      Left Ear: Tympanic membrane normal.      Nose: Nose normal.      Mouth/Throat:      Mouth: Mucous membranes are moist.   Eyes:      Conjunctiva/sclera: Conjunctivae normal.   Neck:      Thyroid: No thyroid tenderness.      Vascular: No carotid bruit.   Cardiovascular:      Rate and Rhythm: Normal rate and regular rhythm.      Heart sounds: Normal heart sounds. No murmur heard.  Pulmonary:      Effort: Pulmonary effort is normal.      Breath sounds: Normal breath sounds.   Abdominal:      General: Bowel sounds are normal.      Palpations: Abdomen is soft.   Musculoskeletal:      Right lower leg: No edema.      Left lower leg: No edema.   Lymphadenopathy:      Cervical: No cervical adenopathy.   Skin:     General: Skin is warm and dry.   Neurological:      Mental Status: He is alert.   Psychiatric:         Mood and Affect: Mood normal.         Behavior: Behavior normal.             Assessment / Plan      Assessment/Plan:   Diagnoses and all orders for this visit:    1. Annual physical exam    2. Primary hypertension  -     CBC Auto Differential    3. Mixed hyperlipidemia  -     Comprehensive Metabolic Panel  -     Lipid Panel    4. Palpitations    5. Impaired fasting glucose  -     Comprehensive Metabolic Panel  -     Hemoglobin A1c  -     UA / M With / Rflx Culture([facility] ONLY) - Urine, Clean Catch; Future  -     UA / M With / Rflx Culture([facility] ONLY) - Urine, Clean Catch    6. Low testosterone in male  -     Testosterone, Free, Total  -     Estradiol    7. Screening PSA (prostate specific antigen)  -     PSA Screen    8. Screening for thyroid disorder  -     TSH    Other orders  -     atorvastatin (LIPITOR) 40 MG tablet; Take 1 tablet by mouth Every Night.  Dispense: 90 tablet; Refill: 1  -     lisinopril (PRINIVIL,ZESTRIL) 40 MG tablet; Take 1 tablet by mouth Daily.  " "Dispense: 90 tablet; Refill: 1  -     atenolol (TENORMIN) 25 MG tablet; Take 1 tablet by mouth 2 (Two) Times a Day.  Dispense: 180 tablet; Refill: 1         Annual physical exam immunizations screening reviewed with patient  Hypertension with low blood pressure readings will reduce hydrochlorothiazide dose to 12.5 mg once daily with a blood pressure check in 2 weeks continue lisinopril 40 mg daily reviewed cardiology note with patient  Palpitations currently controlled atenolol 25 mg twice daily  Impaired fasting glucose will obtain hemoglobin A1c to monitor  Low testosterone on testosterone replacement therapy prescribed by outside provider will obtain labs to monitor      Follow Up:   Return in about 6 months (around 1/18/2026).    Reinaldo Lezama, APRN    \"Please note that portions of this note were completed with a voice recognition program.\"     "

## 2025-07-19 LAB
APPEARANCE UR: CLEAR
BACTERIA #/AREA URNS HPF: NORMAL /[HPF]
BILIRUB UR QL STRIP: NEGATIVE
CASTS URNS QL MICRO: NORMAL /LPF
COLOR UR: YELLOW
EPI CELLS #/AREA URNS HPF: NORMAL /HPF (ref 0–10)
GLUCOSE UR QL STRIP: NEGATIVE
HGB UR QL STRIP: NEGATIVE
KETONES UR QL STRIP: NEGATIVE
LEUKOCYTE ESTERASE UR QL STRIP: NEGATIVE
MICRO URNS: NORMAL
MICRO URNS: NORMAL
NITRITE UR QL STRIP: NEGATIVE
PH UR STRIP: 5.5 [PH] (ref 5–7.5)
PROT UR QL STRIP: NEGATIVE
RBC #/AREA URNS HPF: NORMAL /HPF (ref 0–2)
SP GR UR STRIP: 1.02 (ref 1–1.03)
URINALYSIS REFLEX: NORMAL
UROBILINOGEN UR STRIP-MCNC: 0.2 MG/DL (ref 0.2–1)
WBC #/AREA URNS HPF: NORMAL /HPF (ref 0–5)

## 2025-07-22 LAB
TESTOST FREE SERPL-MCNC: 38.7 PG/ML (ref 6.8–21.5)
TESTOST SERPL-MCNC: 1348 NG/DL (ref 264–916)

## 2025-07-23 ENCOUNTER — RESULTS FOLLOW-UP (OUTPATIENT)
Dept: FAMILY MEDICINE CLINIC | Facility: CLINIC | Age: 50
End: 2025-07-23
Payer: COMMERCIAL

## 2025-07-23 DIAGNOSIS — R74.8 ELEVATED CREATINE KINASE: Primary | ICD-10-CM

## 2025-07-23 NOTE — TELEPHONE ENCOUNTER
"Relay     \"Increase water avoid NSAIDS recheck CMP in 30 days   Testosterone high send to patients provider prescribing his testosterone\"    "

## 2025-08-01 ENCOUNTER — CLINICAL SUPPORT (OUTPATIENT)
Dept: FAMILY MEDICINE CLINIC | Facility: CLINIC | Age: 50
End: 2025-08-01
Payer: COMMERCIAL

## 2025-08-01 VITALS — SYSTOLIC BLOOD PRESSURE: 118 MMHG | DIASTOLIC BLOOD PRESSURE: 77 MMHG | HEART RATE: 64 BPM | OXYGEN SATURATION: 99 %

## 2025-08-01 DIAGNOSIS — Z01.30 BP CHECK: Primary | ICD-10-CM

## 2025-08-23 ENCOUNTER — LAB (OUTPATIENT)
Facility: HOSPITAL | Age: 50
End: 2025-08-23
Payer: COMMERCIAL

## 2025-08-23 DIAGNOSIS — R74.8 ELEVATED CREATINE KINASE: ICD-10-CM

## 2025-08-23 LAB
ALBUMIN SERPL-MCNC: 4.2 G/DL (ref 3.5–5.2)
ALBUMIN/GLOB SERPL: 1.7 G/DL
ALP SERPL-CCNC: 55 U/L (ref 39–117)
ALT SERPL W P-5'-P-CCNC: 17 U/L (ref 1–41)
ANION GAP SERPL CALCULATED.3IONS-SCNC: 12 MMOL/L (ref 5–15)
AST SERPL-CCNC: 23 U/L (ref 1–40)
BILIRUB SERPL-MCNC: 0.3 MG/DL (ref 0–1.2)
BUN SERPL-MCNC: 17 MG/DL (ref 6–20)
BUN/CREAT SERPL: 11.7 (ref 7–25)
CALCIUM SPEC-SCNC: 9.7 MG/DL (ref 8.6–10.5)
CHLORIDE SERPL-SCNC: 99 MMOL/L (ref 98–107)
CO2 SERPL-SCNC: 23 MMOL/L (ref 22–29)
CREAT SERPL-MCNC: 1.45 MG/DL (ref 0.76–1.27)
EGFRCR SERPLBLD CKD-EPI 2021: 59.4 ML/MIN/1.73
GLOBULIN UR ELPH-MCNC: 2.5 GM/DL
GLUCOSE SERPL-MCNC: 102 MG/DL (ref 65–99)
POTASSIUM SERPL-SCNC: 4.4 MMOL/L (ref 3.5–5.2)
PROT SERPL-MCNC: 6.7 G/DL (ref 6–8.5)
SODIUM SERPL-SCNC: 134 MMOL/L (ref 136–145)

## 2025-08-23 PROCEDURE — 80053 COMPREHEN METABOLIC PANEL: CPT

## 2025-08-23 PROCEDURE — 36415 COLL VENOUS BLD VENIPUNCTURE: CPT

## 2025-08-25 ENCOUNTER — RESULTS FOLLOW-UP (OUTPATIENT)
Dept: FAMILY MEDICINE CLINIC | Facility: CLINIC | Age: 50
End: 2025-08-25
Payer: COMMERCIAL

## (undated) DEVICE — Device: Brand: DEFENDO AIR/WATER/SUCTION AND BIOPSY VALVE

## (undated) DEVICE — GLV SURG BIOGEL LTX PF 7 1/2

## (undated) DEVICE — SOL IRRG H2O PL/BG 1000ML STRL

## (undated) DEVICE — SOL IRR NACL 0.9PCT BT 1000ML

## (undated) DEVICE — COLON KIT: Brand: MEDLINE INDUSTRIES, INC.